# Patient Record
Sex: FEMALE | Race: OTHER | HISPANIC OR LATINO | Employment: FULL TIME | URBAN - METROPOLITAN AREA
[De-identification: names, ages, dates, MRNs, and addresses within clinical notes are randomized per-mention and may not be internally consistent; named-entity substitution may affect disease eponyms.]

---

## 2023-08-27 ENCOUNTER — HOSPITAL ENCOUNTER (EMERGENCY)
Facility: HOSPITAL | Age: 23
Discharge: HOME | End: 2023-08-27
Attending: EMERGENCY MEDICINE

## 2023-08-27 VITALS
HEART RATE: 129 BPM | TEMPERATURE: 101 F | OXYGEN SATURATION: 94 % | SYSTOLIC BLOOD PRESSURE: 113 MMHG | RESPIRATION RATE: 20 BRPM | DIASTOLIC BLOOD PRESSURE: 67 MMHG

## 2023-08-27 DIAGNOSIS — N12 PYELONEPHRITIS: Primary | ICD-10-CM

## 2023-08-27 LAB
BACTERIA UR QL AUTO: ABNORMAL /HPF
BILIRUB UR QL STRIP: ABNORMAL
CLARITY UR: CLEAR
COLOR UR: YELLOW
EXT PREGNANCY TEST URINE: NEGATIVE
EXT. CONTROL: NORMAL
GLUCOSE UR STRIP-MCNC: ABNORMAL MG/DL
HGB UR QL STRIP.AUTO: ABNORMAL
KETONES UR STRIP-MCNC: ABNORMAL MG/DL
LEUKOCYTE ESTERASE UR QL STRIP: NEGATIVE
MUCOUS THREADS UR QL AUTO: ABNORMAL
NITRITE UR QL STRIP: NEGATIVE
NON-SQ EPI CELLS URNS QL MICRO: ABNORMAL /HPF
PH UR STRIP.AUTO: 6 [PH] (ref 4.5–8)
PROT UR STRIP-MCNC: >=300 MG/DL
RBC #/AREA URNS AUTO: ABNORMAL /HPF
RENAL EPI CELLS #/AREA URNS HPF: PRESENT /[HPF]
SP GR UR STRIP.AUTO: >=1.03 (ref 1–1.03)
UROBILINOGEN UR QL STRIP.AUTO: 2 E.U./DL
WBC #/AREA URNS AUTO: ABNORMAL /HPF

## 2023-08-27 PROCEDURE — 87086 URINE CULTURE/COLONY COUNT: CPT

## 2023-08-27 PROCEDURE — 96372 THER/PROPH/DIAG INJ SC/IM: CPT

## 2023-08-27 PROCEDURE — 81025 URINE PREGNANCY TEST: CPT

## 2023-08-27 PROCEDURE — 99284 EMERGENCY DEPT VISIT MOD MDM: CPT | Performed by: EMERGENCY MEDICINE

## 2023-08-27 PROCEDURE — 81001 URINALYSIS AUTO W/SCOPE: CPT

## 2023-08-27 PROCEDURE — 99283 EMERGENCY DEPT VISIT LOW MDM: CPT

## 2023-08-27 RX ORDER — ONDANSETRON 4 MG/1
4 TABLET, ORALLY DISINTEGRATING ORAL ONCE
Status: COMPLETED | OUTPATIENT
Start: 2023-08-27 | End: 2023-08-27

## 2023-08-27 RX ORDER — ACETAMINOPHEN 325 MG/1
650 TABLET ORAL ONCE
Status: COMPLETED | OUTPATIENT
Start: 2023-08-27 | End: 2023-08-27

## 2023-08-27 RX ORDER — CEPHALEXIN 500 MG/1
500 CAPSULE ORAL EVERY 6 HOURS SCHEDULED
Qty: 40 CAPSULE | Refills: 0 | Status: SHIPPED | OUTPATIENT
Start: 2023-08-27 | End: 2023-09-06

## 2023-08-27 RX ORDER — IBUPROFEN 400 MG/1
400 TABLET ORAL ONCE
Status: COMPLETED | OUTPATIENT
Start: 2023-08-27 | End: 2023-08-27

## 2023-08-27 RX ADMIN — IBUPROFEN 400 MG: 400 TABLET, FILM COATED ORAL at 03:45

## 2023-08-27 RX ADMIN — ACETAMINOPHEN 650 MG: 325 TABLET, FILM COATED ORAL at 03:45

## 2023-08-27 RX ADMIN — CEFTRIAXONE 1000 MG: 1 INJECTION, POWDER, FOR SOLUTION INTRAMUSCULAR; INTRAVENOUS at 04:58

## 2023-08-27 RX ADMIN — ONDANSETRON 4 MG: 4 TABLET, ORALLY DISINTEGRATING ORAL at 03:45

## 2023-08-27 NOTE — DISCHARGE INSTRUCTIONS
Take antibiotics as prescribed. Follow-up with the primary care doctor above if symptoms do not improve in 2 to 3 days.

## 2023-08-27 NOTE — ED ATTENDING ATTESTATION
8/27/2023  ICody DO, saw and evaluated the patient. I have discussed the patient with the resident/non-physician practitioner and agree with the resident's/non-physician practitioner's findings, Plan of Care, and MDM as documented in the resident's/non-physician practitioner's note, except where noted. All available labs and Radiology studies were reviewed. I was present for key portions of any procedure(s) performed by the resident/non-physician practitioner and I was immediately available to provide assistance. At this point I agree with the current assessment done in the Emergency Department. I have conducted an independent evaluation of this patient a history and physical is as follows:    20 yo female c/o L flank pain, n/v/d for past 3 days, associated with fever, no vag dc or bleeding. Has some dysuria without hematuria. L flank TTP on exam  abd sntnd no r/g bs+    Imp: concern for pyelo plan: UA, upreg, CBC, BMP, tx sx, reassess.       ED Course         Critical Care Time  Procedures

## 2023-08-27 NOTE — ED PROVIDER NOTES
History  Chief Complaint   Patient presents with   • Vomiting     Patient reports n/v/d and fever since Friday, patient also c/o back pain     80-year-old female with no past medical history presents with nausea, vomiting, diarrhea, dysuria, preceded by left flank pain x5 days. Flank pain is described as moderate in intensity and constant. Denies hematuria, abdominal pain, pelvic pain, vaginal bleeding, vaginal discharge, or respiratory symptoms. No prior pregnancies. None       History reviewed. No pertinent past medical history. History reviewed. No pertinent surgical history. History reviewed. No pertinent family history. I have reviewed and agree with the history as documented. E-Cigarette/Vaping     E-Cigarette/Vaping Substances     Social History     Tobacco Use   • Smoking status: Never   • Smokeless tobacco: Never   Substance Use Topics   • Alcohol use: Yes     Comment: social   • Drug use: Not Currently        Review of Systems   Constitutional: Negative for chills and fever. HENT: Negative for ear pain and sore throat. Eyes: Negative for pain and visual disturbance. Respiratory: Negative for cough and shortness of breath. Cardiovascular: Negative for chest pain and palpitations. Gastrointestinal: Positive for diarrhea, nausea and vomiting. Negative for abdominal pain. Genitourinary: Positive for dysuria. Negative for hematuria. Musculoskeletal: Negative for arthralgias and back pain. Skin: Negative for color change and rash. Neurological: Negative for seizures and syncope. All other systems reviewed and are negative.       Physical Exam  ED Triage Vitals [08/27/23 0235]   Temperature Pulse Respirations Blood Pressure SpO2   (!) 101 °F (38.3 °C) (!) 129 20 113/67 94 %      Temp Source Heart Rate Source Patient Position - Orthostatic VS BP Location FiO2 (%)   Oral Monitor -- -- --      Pain Score       7             Orthostatic Vital Signs  Vitals:    08/27/23 0235 BP: 113/67   Pulse: (!) 129       Physical Exam  Vitals and nursing note reviewed. Constitutional:       General: She is not in acute distress. Appearance: Normal appearance. She is not ill-appearing, toxic-appearing or diaphoretic. HENT:      Head: Normocephalic and atraumatic. Right Ear: External ear normal.      Left Ear: External ear normal.      Nose: Nose normal.      Mouth/Throat:      Mouth: Mucous membranes are moist.      Pharynx: Oropharynx is clear. Eyes:      General:         Right eye: No discharge. Left eye: No discharge. Conjunctiva/sclera: Conjunctivae normal.   Cardiovascular:      Rate and Rhythm: Regular rhythm. Tachycardia present. Pulses: Normal pulses. Heart sounds: Normal heart sounds. No murmur heard. No friction rub. Pulmonary:      Effort: Pulmonary effort is normal. No respiratory distress. Breath sounds: Normal breath sounds. No wheezing or rales. Abdominal:      General: Abdomen is flat. Bowel sounds are normal. There is no distension. Palpations: Abdomen is soft. Tenderness: There is no abdominal tenderness. There is left CVA tenderness. There is no right CVA tenderness or guarding. Musculoskeletal:         General: Normal range of motion. Cervical back: Normal range of motion. Skin:     General: Skin is warm and dry. Capillary Refill: Capillary refill takes less than 2 seconds. Coloration: Skin is not pale. Neurological:      Mental Status: She is alert and oriented to person, place, and time.    Psychiatric:         Mood and Affect: Mood normal.         Behavior: Behavior normal.         ED Medications  Medications   cefTRIAXone (ROCEPHIN) 1,000 mg in lidocaine (PF) (XYLOCAINE-MPF) 1 % IM only syringe (has no administration in time range)   acetaminophen (TYLENOL) tablet 650 mg (650 mg Oral Given 8/27/23 0345)   ibuprofen (MOTRIN) tablet 400 mg (400 mg Oral Given 8/27/23 0345)   ondansetron (ZOFRAN-ODT) dispersible tablet 4 mg (4 mg Oral Given 8/27/23 0345)       Diagnostic Studies  Results Reviewed     Procedure Component Value Units Date/Time    Urine Microscopic [[id number]]  (Abnormal) Collected: 08/27/23 0355    Lab Status: Final result Specimen: Urine, Clean Catch Updated: 08/27/23 0407     RBC, UA 10-20 /hpf      WBC, UA Innumerable /hpf      Epithelial Cells Moderate /hpf      Bacteria, UA Occasional /hpf      MUCUS THREADS Innumerable     Renal Epithelial Cells Present    Urine culture [[id number]] Collected: 08/27/23 0355    Lab Status: In process Specimen: Urine, Clean Catch Updated: 08/27/23 0407    Urine Macroscopic, POC [[id number]]  (Abnormal) Collected: 08/27/23 0355    Lab Status: Final result Specimen: Urine Updated: 08/27/23 0356     Color, UA Yellow     Clarity, UA Clear     pH, UA 6.0     Leukocytes, UA Negative     Nitrite, UA Negative     Protein, UA >=300 mg/dl      Glucose,  (1/10%) mg/dl      Ketones, UA 15 (1+) mg/dl      Urobilinogen, UA 2.0 E.U./dl      Bilirubin, UA Moderate     Occult Blood, UA Small     Specific Gravity, UA >=1.030    Narrative:      CLINITEK RESULT    POCT pregnancy, urine [[id number]]  (Normal) Resulted: 08/27/23 0320    Lab Status: Final result Updated: 08/27/23 0320     EXT Preg Test, Ur Negative     Control Valid                 No orders to display         Procedures  Procedures      ED Course                             SBIRT 20yo+    Flowsheet Row Most Recent Value   Initial Alcohol Screen: US AUDIT-C     1. How often do you have a drink containing alcohol? 0 Filed at: 08/27/2023 0406   2. How many drinks containing alcohol do you have on a typical day you are drinking? 0 Filed at: 08/27/2023 0406   3b. FEMALE Any Age, or MALE 65+: How often do you have 4 or more drinks on one occassion? 0 Filed at: 08/27/2023 0406   Audit-C Score 0 Filed at: 08/27/2023 2970   LETICIA: How many times in the past year have you. ..     Used an illegal drug or used a prescription medication for non-medical reasons? Never Filed at: 08/27/2023 0406                Medical Decision Making  51-year-old female with presentation suggestive of upper urinary tract infection. No findings on history or physical exam to suggest urosepsis, ureterolithiasis, or any acute surgical abdominal pathology. Plan: Urine pregnancy, urinalysis, analgesia    Pyelonephritis: acute illness or injury  Amount and/or Complexity of Data Reviewed  Labs: ordered. Risk  OTC drugs. Prescription drug management. Disposition  Final diagnoses:   Pyelonephritis     Time reflects when diagnosis was documented in both MDM as applicable and the Disposition within this note     Time User Action Codes Description Comment    8/27/2023  4:37 AM Regla Garcia Add [N12] Pyelonephritis       ED Disposition     ED Disposition   Discharge    Condition   Stable    Date/Time   Sun Aug 27, 2023  4:37 AM    Comment   [address] [place] [address] discharge to home/self care. Follow-up Information     Follow up With Specialties Details Why Contact Info Additional [facility] [place] [address] Family Medicine Schedule an appointment as soon as possible for a visit  If symptoms worsen [facility] [id number]  [address] [place] [address], [address] [place] [address]          Patient's Medications   Discharge Prescriptions    CEPHALEXIN (KEFLEX) 500 MG CAPSULE    Take 1 capsule (500 mg total) by mouth every 6 (six) hours for 10 days       Start Date: 8/27/2023 End Date: 9/6/2023       Order Dose: 500 mg       Quantity: 40 capsule    Refills: 0     No discharge procedures on file. PDMP Review     None           ED Provider  Attending physically available and evaluated [address] [place] [address]. I managed the patient along with the ED Attending.     Electronically Signed by         Jorge Oviedo MD  08/27/23 2264

## 2023-08-28 LAB — BACTERIA UR CULT: NORMAL

## 2024-01-02 ENCOUNTER — ULTRASOUND (OUTPATIENT)
Dept: OBGYN CLINIC | Facility: CLINIC | Age: 24
End: 2024-01-02

## 2024-01-02 VITALS — WEIGHT: 111.2 LBS | SYSTOLIC BLOOD PRESSURE: 129 MMHG | HEART RATE: 80 BPM | DIASTOLIC BLOOD PRESSURE: 65 MMHG

## 2024-01-02 DIAGNOSIS — N92.6 MISSED PERIOD: Primary | ICD-10-CM

## 2024-01-02 DIAGNOSIS — Z3A.09 9 WEEKS GESTATION OF PREGNANCY: ICD-10-CM

## 2024-01-02 LAB — SL AMB POCT URINE HCG: POSITIVE

## 2024-01-02 NOTE — ASSESSMENT & PLAN NOTE
- Carrizales, viable, intrauterine pregnancy at 9w3d by LMP  - Pregnancy warning signs reviewed  - Return to office for OB nurse intake visit and prenatal H&P

## 2024-01-02 NOTE — PROGRESS NOTES
Critical access hospital  Obstetrics & Gynecology  2024    S: 23 y.o.  who presents for viability scan. Her LMP was 10/28/23 and she is 9 weeks and 3 days by her LMP. She reports no complaints today. She denies cramping or vaginal bleeding. This pregnancy was planned. Previous pregnancies 1 spontaneous  that did not require medications or surgeries. Medical problems include: none. Current medications: prenatal vitamins. Smoking/drinking/illicit drug use: denies.     O:  Vitals:    24 0859   BP: 129/65   Pulse: 80   Weight: 50.4 kg (111 lb 3.2 oz)     There is no height or weight on file to calculate BMI.    Imaging:  First Trimester Ultrasound  Indication: Amenorrhea, viability/dating  Comparison: None.  Technique: Transvaginal imaging was performed to assess the gestation, myometrial/endometrial architecture and ovarian parenchymal detail  Findings: A single intrauterine gestation is identified with cardiac activity is detected at 166bpm. Yolk sac is present and normal in size and appearance. Mean crown rump length is 23.4 mm = 9 weeks 0 days. Amniotic fluid appears normal. No adnexal masses or pathologic cysts identified. No free fluid.  Impression: Ultrasound dating consistent with LMP. Final SUDHIR of 8/3/2024 (by LMP).     A/P:  Problem List Items Addressed This Visit          Other    9 weeks gestation of pregnancy     - Carrizales, viable, intrauterine pregnancy at 9w3d by LMP  - Pregnancy warning signs reviewed  - Return to office for OB nurse intake visit and prenatal H&P           Relevant Orders    HIV 1/2 AG/AB w Reflex [facility] for 2 yr old and above    Hepatitis C antibody    UA (URINE) with reflex to Scope    Urine culture    Hepatitis B surface antigen    CBC and differential    Rubella antibody, IgG    Type and screen    RPR-Syphilis Screening (Total Syphilis IGG/IGM)    Hepatitis B surface antibody    Anemia Panel w/Reflex, OB    Inheritest CF/SMA Panel    Hgb Fractionation Cascade     SMA Carrier Screen    Ambulatory Referral to Maternal Fetal Medicine     Other Visit Diagnoses       Missed period    -  Primary    Relevant Orders    POCT urine HCG    AMB US OB < 14 weeks single or first gestation level 1 (Completed)            Patient discussed with Dr. Hitchcock.    Ashley Joshi MD  PGY-IV, OB/GYN  1/2/2024, 9:27 AM

## 2024-01-10 ENCOUNTER — LAB (OUTPATIENT)
Dept: LAB | Facility: CLINIC | Age: 24
End: 2024-01-10
Payer: COMMERCIAL

## 2024-01-10 DIAGNOSIS — Z3A.09 9 WEEKS GESTATION OF PREGNANCY: ICD-10-CM

## 2024-01-10 LAB
ABO GROUP BLD: NORMAL
BACTERIA UR QL AUTO: ABNORMAL /HPF
BASOPHILS # BLD AUTO: 0.05 THOUSANDS/ÂΜL (ref 0–0.1)
BASOPHILS NFR BLD AUTO: 1 % (ref 0–1)
BILIRUB UR QL STRIP: NEGATIVE
BLD GP AB SCN SERPL QL: NEGATIVE
CLARITY UR: CLEAR
COLOR UR: YELLOW
EOSINOPHIL # BLD AUTO: 0.16 THOUSAND/ÂΜL (ref 0–0.61)
EOSINOPHIL NFR BLD AUTO: 2 % (ref 0–6)
ERYTHROCYTE [DISTWIDTH] IN BLOOD BY AUTOMATED COUNT: 12.8 % (ref 11.6–15.1)
GLUCOSE UR STRIP-MCNC: NEGATIVE MG/DL
HBV SURFACE AB SER-ACNC: <3 MIU/ML
HBV SURFACE AG SER QL: NORMAL
HCT VFR BLD AUTO: 39 % (ref 34.8–46.1)
HCV AB SER QL: NORMAL
HGB BLD-MCNC: 12.8 G/DL (ref 11.5–15.4)
HGB UR QL STRIP.AUTO: NEGATIVE
HIV 1+2 AB+HIV1 P24 AG SERPL QL IA: NORMAL
HIV 2 AB SERPL QL IA: NORMAL
HIV1 AB SERPL QL IA: NORMAL
HIV1 P24 AG SERPL QL IA: NORMAL
IMM GRANULOCYTES # BLD AUTO: 0.02 THOUSAND/UL (ref 0–0.2)
IMM GRANULOCYTES NFR BLD AUTO: 0 % (ref 0–2)
KETONES UR STRIP-MCNC: NEGATIVE MG/DL
LEUKOCYTE ESTERASE UR QL STRIP: NEGATIVE
LYMPHOCYTES # BLD AUTO: 1.27 THOUSANDS/ÂΜL (ref 0.6–4.47)
LYMPHOCYTES NFR BLD AUTO: 17 % (ref 14–44)
MCH RBC QN AUTO: 29.6 PG (ref 26.8–34.3)
MCHC RBC AUTO-ENTMCNC: 32.8 G/DL (ref 31.4–37.4)
MCV RBC AUTO: 90 FL (ref 82–98)
MONOCYTES # BLD AUTO: 0.59 THOUSAND/ÂΜL (ref 0.17–1.22)
MONOCYTES NFR BLD AUTO: 8 % (ref 4–12)
MUCOUS THREADS UR QL AUTO: ABNORMAL
NEUTROPHILS # BLD AUTO: 5.51 THOUSANDS/ÂΜL (ref 1.85–7.62)
NEUTS SEG NFR BLD AUTO: 72 % (ref 43–75)
NITRITE UR QL STRIP: NEGATIVE
NON-SQ EPI CELLS URNS QL MICRO: ABNORMAL /HPF
NRBC BLD AUTO-RTO: 0 /100 WBCS
PH UR STRIP.AUTO: 7 [PH]
PLATELET # BLD AUTO: 312 THOUSANDS/UL (ref 149–390)
PMV BLD AUTO: 10.2 FL (ref 8.9–12.7)
PROT UR STRIP-MCNC: ABNORMAL MG/DL
RBC # BLD AUTO: 4.32 MILLION/UL (ref 3.81–5.12)
RBC #/AREA URNS AUTO: ABNORMAL /HPF
RH BLD: POSITIVE
RUBV IGG SERPL IA-ACNC: 17.5 IU/ML
SP GR UR STRIP.AUTO: 1.03 (ref 1–1.03)
SPECIMEN EXPIRATION DATE: NORMAL
TREPONEMA PALLIDUM IGG+IGM AB [PRESENCE] IN SERUM OR PLASMA BY IMMUNOASSAY: NORMAL
UROBILINOGEN UR STRIP-ACNC: <2 MG/DL
WBC # BLD AUTO: 7.6 THOUSAND/UL (ref 4.31–10.16)
WBC #/AREA URNS AUTO: ABNORMAL /HPF

## 2024-01-10 PROCEDURE — 87077 CULTURE AEROBIC IDENTIFY: CPT

## 2024-01-10 PROCEDURE — 85025 COMPLETE CBC W/AUTO DIFF WBC: CPT

## 2024-01-10 PROCEDURE — 81001 URINALYSIS AUTO W/SCOPE: CPT | Performed by: OBSTETRICS & GYNECOLOGY

## 2024-01-10 PROCEDURE — 86706 HEP B SURFACE ANTIBODY: CPT

## 2024-01-10 PROCEDURE — 86803 HEPATITIS C AB TEST: CPT

## 2024-01-10 PROCEDURE — 86850 RBC ANTIBODY SCREEN: CPT

## 2024-01-10 PROCEDURE — 87340 HEPATITIS B SURFACE AG IA: CPT

## 2024-01-10 PROCEDURE — 86900 BLOOD TYPING SEROLOGIC ABO: CPT

## 2024-01-10 PROCEDURE — 87086 URINE CULTURE/COLONY COUNT: CPT

## 2024-01-10 PROCEDURE — 86901 BLOOD TYPING SEROLOGIC RH(D): CPT

## 2024-01-10 PROCEDURE — 36415 COLL VENOUS BLD VENIPUNCTURE: CPT

## 2024-01-10 PROCEDURE — 87389 HIV-1 AG W/HIV-1&-2 AB AG IA: CPT

## 2024-01-10 PROCEDURE — 86762 RUBELLA ANTIBODY: CPT

## 2024-01-10 PROCEDURE — 86780 TREPONEMA PALLIDUM: CPT

## 2024-01-10 PROCEDURE — 83020 HEMOGLOBIN ELECTROPHORESIS: CPT

## 2024-01-12 LAB
BACTERIA UR CULT: ABNORMAL
BACTERIA UR CULT: ABNORMAL
HGB A MFR BLD: 2.4 % (ref 1.8–3.2)
HGB A MFR BLD: 97.6 % (ref 96.4–98.8)
HGB F MFR BLD: 0 % (ref 0–2)
HGB FRACT BLD-IMP: NORMAL
HGB S MFR BLD: 0 %

## 2024-01-15 DIAGNOSIS — O23.41 URINARY TRACT INFECTION IN MOTHER DURING FIRST TRIMESTER OF PREGNANCY: Primary | ICD-10-CM

## 2024-01-15 RX ORDER — METRONIDAZOLE 500 MG/1
500 TABLET ORAL EVERY 8 HOURS SCHEDULED
Qty: 21 TABLET | Refills: 0 | Status: SHIPPED | OUTPATIENT
Start: 2024-01-15 | End: 2024-01-22

## 2024-01-15 NOTE — RESULT ENCOUNTER NOTE
Leonard Zambrano,    You will be seeing this patient tomorrow for her nurse intake. Can you please let her know that we will be treating her Gardnerella UTI with Flagyl for 7 days? Otherwise her prenatal labs are normal.    Thank you,  Ashley Joshi

## 2024-01-16 ENCOUNTER — INITIAL PRENATAL (OUTPATIENT)
Dept: OBGYN CLINIC | Facility: CLINIC | Age: 24
End: 2024-01-16

## 2024-01-16 VITALS
SYSTOLIC BLOOD PRESSURE: 110 MMHG | DIASTOLIC BLOOD PRESSURE: 66 MMHG | BODY MASS INDEX: 19.97 KG/M2 | WEIGHT: 117 LBS | HEIGHT: 64 IN | HEART RATE: 81 BPM

## 2024-01-16 DIAGNOSIS — Z3A.11 11 WEEKS GESTATION OF PREGNANCY: ICD-10-CM

## 2024-01-16 DIAGNOSIS — Z34.91 PRENATAL CARE IN FIRST TRIMESTER: Primary | ICD-10-CM

## 2024-01-16 DIAGNOSIS — Z31.430 ENCOUNTER OF FEMALE FOR TESTING FOR GENETIC DISEASE CARRIER STATUS FOR PROCREATIVE MANAGEMENT: ICD-10-CM

## 2024-01-16 PROCEDURE — OBC

## 2024-01-16 NOTE — LETTER
1/16/2024      This letter is to confirm that Lakesha Chong is pregnant and is under our care.  Her Estimated Date of Delivery: 8/3/24.    If you have any questions or concerns, please contact our office.  Thank you,    PHILIP Villa

## 2024-01-16 NOTE — LETTER
"    [facility] REFERRAL      Date: 1/16/2024    Patient name: Lakesha Chong    YOB: 2000    Estimated Date of Delivery: 8/3/24      /66 (BP Location: Left arm, Patient Position: Sitting, Cuff Size: Standard)   Pulse 81   Ht 5' 4\" (1.626 m)   Wt 53.1 kg (117 lb)   LMP 10/28/2023   BMI 20.08 kg/m²         Thank you,  PHILIP Villa            [facility] locations:   1. [facility]       [address] [place] [address]       Phone: [phone]       Fax: [phone]     2. [facility] [place]       [address] [address]       Phone: [phone]       Fax: [phone]       "

## 2024-01-16 NOTE — PATIENT INSTRUCTIONS
SENALES CARITO EL EMBARAZO  Llame a nuestra oficina al [phone] para cualquiera de los siguientes:    1. sangrado vaginal  2. Dolor abdominal valentina que no desaparece.  3. Fiebre (más de 100.4 y no se marcos con Tylenol)  4. Vómitos persistentes que dominguez más de 24 horas.  5. dolor de pecho  6. Dolor o ardor al orinar.  7. Dolor de john severo que no se resuelve con Tylenol  8. Visión borrosa o zack puntos en jay visión.  9. Hinchazón repentina de jay dalton o raul.  10. Enrojecimiento, hinchazón o dolor en brandon pierna.  11. Un aumento de peso repentino en pocos días.  12. Contar los movimientos fetales del bebé. (después de 28 semanas o el sexto mes de embarazo)  13. Brandon pérdida de líquido acuoso de la vagina: puede ser un chorro, un goteo o brandon humedad continua  14. Después de 20 semanas de embarazo, calambres rítmicos (más de 4 por hora) o menstruales jenise dolor bajo / pélvico

## 2024-01-16 NOTE — PROGRESS NOTES
"OBSTETRIC INTAKE VISIT    Lakesha Chong presents today for initial OB visit and intake at 11w3d. LMP - 10/28/23.  Pt informed has a UTI and doctor prescribed Flagyl which is going to be sent to  Pharmacy. Pt verbalized understanding. History obtained from patient and she reports it as follows:    History reviewed. No pertinent past medical history.  History reviewed. No pertinent surgical history.  OB History    Para Term  AB Living   2 0 0 0 1 0   SAB IAB Ectopic Multiple Live Births   1 0 0 0 0      # Outcome Date GA Lbr Luis Angel/2nd Weight Sex Delivery Anes PTL Lv   2 Current            1 SAB 2020 8w0d    SAB        Social History     Tobacco Use    Smoking status: Never    Smokeless tobacco: Never   Vaping Use    Vaping status: Former   Substance Use Topics    Alcohol use: Not Currently     Comment: social    Drug use: Not Currently       Current Outpatient Medications   Medication Instructions    metroNIDAZOLE (FLAGYL) 500 mg, Oral, Every 8 hours scheduled       No Known Allergies    Vitals: /66 (BP Location: Left arm, Patient Position: Sitting, Cuff Size: Standard)   Pulse 81   Ht 5' 4\" (1.626 m)   Wt 53.1 kg (117 lb)   LMP 10/28/2023   BMI 20.08 kg/m²     Review of Systems:  Denies vaginal bleeding or leaking fluid.  Denies abdominal/pelvic pain or contractions.    Plan:  1. OB labwork ordered previously.  Advised patient to have drawn within the next few days.  2. Next ultrasound is scheduled for 24.  3. Return 24 for H&P prenatal visit.  4. Referrals placed for WIC, , and [facility].  5. Given vaccines: Flu vaccine declined this visit  6. Patient's depression screening was assessed with a PHQ-2 score of 1. Their PHQ-9 score was 3. Clinically patient does not have depression. No treatment is required.   in to see patient.  7. Reviewed the following educational topics with patient:   -routine prenatal " visit/ultrasound/labwork schedule   -hospital for delivery and office phone/answering service contact information   -nutritional demands of pregnancy, healthy dietary habits   -listeria, toxoplasmosis, seafood precautions   -weight gain expectations (based on pre-pregnant BMI)   -exercise, rest, and sexual activity during pregnancy   -abstinence from alcohol, tobacco, and illegal drugs   -common discomforts of pregnancy and appropriate management   -OTC medications safe to use in pregnancy   -genetic screening options   -vaccines in pregnancy   -symptoms to report to OB provider    -signs of PTL and pre-eclampsia    -vaginal bleeding/leaking of fluid    -severe n/v-unable to tolerate ANY food/fluids for more than 24 hours

## 2024-01-17 ENCOUNTER — PATIENT OUTREACH (OUTPATIENT)
Dept: OBGYN CLINIC | Facility: CLINIC | Age: 24
End: 2024-01-17

## 2024-01-17 NOTE — PROGRESS NOTES
CHRIS ROJAS attempted to outreach the pt with the [employer] interpretor, attempted x2, there was no answer and no ability to leave a VM.

## 2024-01-22 ENCOUNTER — PATIENT OUTREACH (OUTPATIENT)
Dept: OBGYN CLINIC | Facility: CLINIC | Age: 24
End: 2024-01-22

## 2024-01-26 ENCOUNTER — APPOINTMENT (OUTPATIENT)
Dept: LAB | Facility: HOSPITAL | Age: 24
End: 2024-01-26
Attending: NURSE PRACTITIONER
Payer: COMMERCIAL

## 2024-01-26 DIAGNOSIS — Z34.91 PRENATAL CARE IN FIRST TRIMESTER: ICD-10-CM

## 2024-01-26 DIAGNOSIS — Z3A.11 11 WEEKS GESTATION OF PREGNANCY: ICD-10-CM

## 2024-01-26 DIAGNOSIS — Z31.430 ENCOUNTER OF FEMALE FOR TESTING FOR GENETIC DISEASE CARRIER STATUS FOR PROCREATIVE MANAGEMENT: ICD-10-CM

## 2024-01-26 PROCEDURE — 36415 COLL VENOUS BLD VENIPUNCTURE: CPT

## 2024-01-30 NOTE — PROGRESS NOTES
CHRIS ROJAS outreached the patient to complete a PN SW assessment, pt reports that she is at work right now so can not complete the assessment but is interested in applying for medicaid as a secondary insurance. CHRIS ROJAS sent her the information for the financial counselors office for her to call and discuss further as they can advise her if she would qualify based on income.     CHRIS ROJAS emailed FC number, however, email bounced back as undeliverable.    Pt states I can call back Friday anytime to f/u with completing the assessment.

## 2024-02-01 ENCOUNTER — DOCUMENTATION (OUTPATIENT)
Dept: PERINATAL CARE | Facility: OTHER | Age: 24
End: 2024-02-01

## 2024-02-01 ENCOUNTER — ROUTINE PRENATAL (OUTPATIENT)
Age: 24
End: 2024-02-01
Payer: COMMERCIAL

## 2024-02-01 VITALS
DIASTOLIC BLOOD PRESSURE: 54 MMHG | HEIGHT: 64 IN | SYSTOLIC BLOOD PRESSURE: 94 MMHG | HEART RATE: 73 BPM | WEIGHT: 117 LBS | BODY MASS INDEX: 19.97 KG/M2

## 2024-02-01 DIAGNOSIS — Z36.82 ENCOUNTER FOR (NT) NUCHAL TRANSLUCENCY SCAN: Primary | ICD-10-CM

## 2024-02-01 DIAGNOSIS — Z3A.13 13 WEEKS GESTATION OF PREGNANCY: ICD-10-CM

## 2024-02-01 PROCEDURE — 99202 OFFICE O/P NEW SF 15 MIN: CPT | Performed by: STUDENT IN AN ORGANIZED HEALTH CARE EDUCATION/TRAINING PROGRAM

## 2024-02-01 PROCEDURE — 76813 OB US NUCHAL MEAS 1 GEST: CPT | Performed by: STUDENT IN AN ORGANIZED HEALTH CARE EDUCATION/TRAINING PROGRAM

## 2024-02-01 NOTE — PROGRESS NOTES
"[facility]: Ms. Chong was seen today for nuchal translucency ultrasound.  See ultrasound report under \"OB Procedures\" tab.        Physical Exam  Constitutional:       General: She is not in acute distress.     Appearance: Normal appearance.   HENT:      Head: Normocephalic and atraumatic.   Eyes:      Extraocular Movements: Extraocular movements intact.   Cardiovascular:      Rate and Rhythm: Normal rate.   Pulmonary:      Effort: Pulmonary effort is normal. No respiratory distress.   Skin:     Findings: No erythema or rash.   Neurological:      Mental Status: She is alert and oriented to person, place, and time.   Psychiatric:         Mood and Affect: Mood normal.         Behavior: Behavior normal.         Please don't hesitate to contact our office with any concerns or questions.  -Fabby Resendiz MD    "

## 2024-02-01 NOTE — LETTER
"2024     Ashley Joshi MD  [address] [facility]  [address] [place], [place] [address]    Patient: Lakesha Chong   YOB: 2000   Date of Visit: 2024       Dear Dr. Joshi:    Thank you for referring Lakesha Chong to me for evaluation. Below are my notes for this consultation.    If you have questions, please do not hesitate to call me. I look forward to following your patient along with you.         Sincerely,        Fabby Resendiz MD        CC: No Recipients    Fabby Resendiz MD  2024  8:41 AM  Sign when Signing Visit  [facility]: Ms. Chong was seen today for nuchal translucency ultrasound.  See ultrasound report under \"OB Procedures\" tab.        Physical Exam  Constitutional:       General: She is not in acute distress.     Appearance: Normal appearance.   HENT:      Head: Normocephalic and atraumatic.   Eyes:      Extraocular Movements: Extraocular movements intact.   Cardiovascular:      Rate and Rhythm: Normal rate.   Pulmonary:      Effort: Pulmonary effort is normal. No respiratory distress.   Skin:     Findings: No erythema or rash.   Neurological:      Mental Status: She is alert and oriented to person, place, and time.   Psychiatric:         Mood and Affect: Mood normal.         Behavior: Behavior normal.         Please don't hesitate to contact our office with any concerns or questions.  -Fabby Resendiz MD    "

## 2024-02-01 NOTE — PROGRESS NOTES
Insurance Authorization for your MAT21 [facility] TEST CODE [id number] genetic screening has been approved. Auth # is [id number] with dates of service 02/01/2024 -05/01/2024.  Per Burt at [facility] call ref#[id number]  Insurance Authorization is not a guarantee of payment and final determination is based on your member benefits, appropriateness of service provided and eligibility at time of services rendered and claim received. Please follow up with [employer] for your OOP copay.  Patient has a nurse visit 2/7/24 at [facility] @1145 am

## 2024-02-02 ENCOUNTER — PATIENT OUTREACH (OUTPATIENT)
Dept: OBGYN CLINIC | Facility: CLINIC | Age: 24
End: 2024-02-02

## 2024-02-02 NOTE — PROGRESS NOTES
CHRIS ROJAS was referred by PHILIP Bowen to complete a PN SW assessment in the first trimester. Pt is currently , she is [id number], her SUDHIR is 2024. She is Icelandic speaking and CHRIS ROJAS utilized the [employer]or to complete the assessment via phone today.     The patient reports this pregnancy is planned and welcomed. She lives with her partner in an apartment . They have no housing concerns. Pt is employed selling cleaning products. FOB is employed doing construction. Pt has [facility] commercial insurance, she is interested in seeing if she would qualify for medicaid secondary, CHRIS ROJAS sent her the FC number to discuss further. Pt plans to apply for WIC. She has no concerns about obtaining baby supplies. FOB drives her to appointments.     Pt denies any CYS, legal, IPV, MH or D&A history or concerns.     CHRIS ROJAS emailed the financial counselor information to   [email or web address]    CHRIS ROJAS encouraged the pt to outreach as needed but otherwise will close this referral as no social work needs have been identified.

## 2024-02-05 LAB
CITATION REF LAB TEST: NORMAL
CLINICAL INFO: NORMAL
ETHNIC BACKGROUND STATED: NORMAL
GENE DIS ANL CARRIER INTERP-IMP: NORMAL
GENE MUT TESTED BLD/T: NORMAL
LAB DIRECTOR NAME PROVIDER: NORMAL
REASON FOR REFERRAL (NARRATIVE): NORMAL
RECOMMENDATION PATIENT DOC-IMP: NORMAL
REF LAB TEST METHOD: NORMAL
SERVICE CMNT-IMP: NORMAL
SMN1 GENE MUT ANL BLD/T: NORMAL
SPECIMEN SOURCE: NORMAL

## 2024-02-07 ENCOUNTER — OFFICE VISIT (OUTPATIENT)
Age: 24
End: 2024-02-07
Payer: COMMERCIAL

## 2024-02-07 DIAGNOSIS — Z36.0 ENCOUNTER FOR ANTENATAL SCREENING FOR CHROMOSOMAL ANOMALIES: Primary | ICD-10-CM

## 2024-02-07 PROCEDURE — 36415 COLL VENOUS BLD VENIPUNCTURE: CPT | Performed by: OBSTETRICS & GYNECOLOGY

## 2024-02-07 NOTE — PROGRESS NOTES
Patient chose to have [facility] [id number] Non-Invasive Prenatal Screen with fetal sex.   Patient choose billed through insurance.     Patient given brochure and is aware [facility] will contact patient's insurance and coordinate coverage.  Provided [facility] contact information. General inquiries [phone], Cost estimates [phone] and [facility] Billing [phone]. Website [email or web address].     Blood collection tubes labeled with patient identifiers (name, medical record number, and date of birth).     Filled out [facility] order form. Patient chose to have blood drawn in our Maternal Fetal Medicine office. Blood work drawn by Rowan Duncan   If patient had blood work in office: Blood drawn right arm and with a straight needle.   If patient chose to have blood work drawn at a [facility] lab we requested patient notify MFM (via phone call or [email or web address] message) when blood collected so office can follow up on results.     Copy of lab order scanned to Epic media.     Maternal Fetal Medicine will have results in approximately 5-7 business days and will call patient or notify via [email or web address].  Patient aware viewing lab result online will reveal fetal sex if ordered.    Patient verbalized understanding of all instructions and no questions at this time.

## 2024-02-09 ENCOUNTER — INITIAL PRENATAL (OUTPATIENT)
Dept: OBGYN CLINIC | Facility: CLINIC | Age: 24
End: 2024-02-09

## 2024-02-09 VITALS
SYSTOLIC BLOOD PRESSURE: 106 MMHG | WEIGHT: 117.8 LBS | DIASTOLIC BLOOD PRESSURE: 69 MMHG | HEIGHT: 64 IN | HEART RATE: 78 BPM | BODY MASS INDEX: 20.11 KG/M2

## 2024-02-09 DIAGNOSIS — Z3A.14 14 WEEKS GESTATION OF PREGNANCY: ICD-10-CM

## 2024-02-09 DIAGNOSIS — Z34.92 PRENATAL CARE IN SECOND TRIMESTER: Primary | ICD-10-CM

## 2024-02-09 PROBLEM — Z78.9 NOT IMMUNE TO HEPATITIS B VIRUS: Status: ACTIVE | Noted: 2024-02-09

## 2024-02-09 PROCEDURE — PNV: Performed by: NURSE PRACTITIONER

## 2024-02-09 PROCEDURE — 87624 HPV HI-RISK TYP POOLED RSLT: CPT | Performed by: NURSE PRACTITIONER

## 2024-02-09 PROCEDURE — G0145 SCR C/V CYTO,THINLAYER,RESCR: HCPCS | Performed by: PATHOLOGY

## 2024-02-09 PROCEDURE — G0124 SCREEN C/V THIN LAYER BY MD: HCPCS | Performed by: PATHOLOGY

## 2024-02-09 NOTE — PATIENT INSTRUCTIONS
[place]averick por jay confianza en nuestro equipo.   Le agradecemos y agradecemos bel comentarios.   Si recibe candice encuesta nuestra, tómese unos momentos para informarnos cómo estamos.   Sinceramente,  PHILIP Bhatti     SENALES CARITO EL EMBARAZO  Llame a nuestra oficina al [phone] para cualquiera de los siguientes:    1. sangrado vaginal  2. Dolor abdominal valentina que no desaparece.  3. Fiebre (más de 100.4 y no se marcos con Tylenol)  4. Vómitos persistentes que dominguez más de 24 horas.  5. dolor de pecho  6. Dolor o ardor al orinar.  7. Dolor de john severo que no se resuelve con Tylenol  8. Visión borrosa o zack puntos en jay visión.  9. Hinchazón repentina de jay dalton o raul.  10. Enrojecimiento, hinchazón o dolor en candice pierna.  11. Un aumento de peso repentino en pocos días.  12. Contar los movimientos fetales del bebé. (después de 28 semanas o el sexto mes de embarazo)  13. Candice pérdida de líquido acuoso de la vagina: puede ser un chorro, un goteo o candice humedad continua  14. Después de 20 semanas de embarazo, calambres rítmicos (más de 4 por hora) o menstruales jenise dolor bajo / pélvico

## 2024-02-09 NOTE — PROGRESS NOTES
Lakesha Chong presents today for OB H&P visit at 14w6d.  Blood Pressure: 106/69  Wt=53.4 kg (117 lb 12.8 oz); Body mass index is 20.22 kg/m².; TWG=3.538 kg (7 lb 12.8 oz)  Fetal Heart Rate: 145  Abdomen: gravid, soft, non-tender.  She reports no complaints.  Denies uterine contractions or persistent cramping.  Denies vaginal bleeding or leaking of fluid.  Pap/GC/CT collected.  Scheduled for ultrasound 3/26/24.  Reviewed common discomforts of pregnancy in second trimester and warning signs.  Advised to continue medications and return in 4 weeks.      Current Outpatient Medications   Medication Instructions    Prenatal Vit-Fe Fumarate-FA (PRENATAL VITAMIN PO) 1 tablet, Oral, Daily       Laboratory workup: initial OB labs (done 1/10/24)    Genetic Screening: desires NIPS    Vaccinations: influenza (declined 24); Tdap (will offer after 27 weeks); RSV (not indicated outside RSV season); COVID-19 (rec'd x3 doses per pt - will bring documentation)    Postpartum contraception: desires Nexplanon    Fetal Ultrasounds:  24 (9w0d) EDC confirmed  24 (13w5d) NT WNL      G1 Problems (from 24 to present)       Problem Noted Resolved    Not immune to HBV 2024 by PHILIP Bhatti No    Overview Signed 2024  2:44 PM by PHILIP Bhatti     Referred to PCP               Past Medical History:   Diagnosis Date    No known health problems      Past Surgical History:   Procedure Laterality Date    NO PAST SURGERIES       OB History          2    Para   0    Term   0       0    AB   1    Living   0         SAB   1    IAB   0    Ectopic   0    Multiple   0    Live Births   0               Social History     Tobacco Use    Smoking status: Never    Smokeless tobacco: Never   Vaping Use    Vaping status: Former    Quit date: 12/15/2023    Substances: Nicotine, Flavoring   Substance Use Topics    Alcohol use: Not Currently     Comment: couple times/month, none since  pregnant    Drug use: Never

## 2024-02-11 LAB
CFDNA.FET/CFDNA.TOTAL SFR FETUS: NORMAL %
CFDNA.FET/CFDNA.TOTAL SFR FETUS: NORMAL %
CITATION REF LAB TEST: NORMAL
FET 13+18+21+X+Y ANEUP PLAS.CFDNA: NEGATIVE
FET CHR 21 TS PLAS.CFDNA QL: NEGATIVE
FET CHR 21 TS PLAS.CFDNA QL: NEGATIVE
FET MS X RISK WBC.DNA+CFDNA QL: NOT DETECTED
FET SEX PLAS.CFDNA DOSAGE CFDNA: NORMAL
FET TS 13 RISK PLAS.CFDNA QL: NEGATIVE
FET X + Y ANEUP RISK PLAS.CFDNA SEQ-IMP: NOT DETECTED
GESTATION: NORMAL
GESTATIONAL AGE > 9:: YES
LAB DIRECTOR NAME PROVIDER: NORMAL
LABORATORY COMMENT REPORT: NORMAL
LIMITATIONS OF THE TEST: NORMAL
NEGATIVE PREDICTIVE VALUE: NORMAL
PERFORMANCE CHARACTERISTICS: NORMAL
POSITIVE PREDICTIVE VALUE: NORMAL
REF LAB TEST METHOD: NORMAL
SL AMB NOTE:: NORMAL
TEST PERFORMANCE INFO SPEC: NORMAL

## 2024-02-15 PROBLEM — R87.619 ABNORMAL PAP SMEAR OF CERVIX: Status: ACTIVE | Noted: 2024-02-15

## 2024-02-15 LAB
HPV HR 12 DNA CVX QL NAA+PROBE: POSITIVE
HPV16 DNA CVX QL NAA+PROBE: NEGATIVE
HPV18 DNA CVX QL NAA+PROBE: NEGATIVE
LAB AP GYN PRIMARY INTERPRETATION: ABNORMAL
LAB AP LMP: ABNORMAL
Lab: ABNORMAL
PATH INTERP SPEC-IMP: ABNORMAL

## 2024-02-16 ENCOUNTER — TELEPHONE (OUTPATIENT)
Dept: OBGYN CLINIC | Facility: CLINIC | Age: 24
End: 2024-02-16

## 2024-02-16 LAB
CFTR FULL MUT ANL BLD/T SEQ: NORMAL
CITATION REF LAB TEST: NORMAL
CLINICAL INFO: NORMAL
ETHNIC BACKGROUND STATED: NORMAL
GENE DIS ANL CARRIER INTERP-IMP: NORMAL
INDICATION: NORMAL
LAB DIRECTOR NAME PROVIDER: NORMAL
RECOMMENDATION PATIENT DOC-IMP: NORMAL
REF LAB TEST METHOD: NORMAL
SERVICE CMNT-IMP: NORMAL
SPECIMEN SOURCE: NORMAL

## 2024-02-16 NOTE — TELEPHONE ENCOUNTER
Please contact patient in Azeri and explain that pap smear was slightly abnormal and + HPV, but no intervention indicated at this time.  Will just repeat pap smear next year.

## 2024-03-08 ENCOUNTER — ROUTINE PRENATAL (OUTPATIENT)
Dept: OBGYN CLINIC | Facility: CLINIC | Age: 24
End: 2024-03-08

## 2024-03-08 VITALS
HEIGHT: 64 IN | WEIGHT: 118 LBS | BODY MASS INDEX: 20.14 KG/M2 | HEART RATE: 76 BPM | DIASTOLIC BLOOD PRESSURE: 72 MMHG | SYSTOLIC BLOOD PRESSURE: 113 MMHG

## 2024-03-08 DIAGNOSIS — Z3A.18 18 WEEKS GESTATION OF PREGNANCY: ICD-10-CM

## 2024-03-08 DIAGNOSIS — Z34.92 PRENATAL CARE IN SECOND TRIMESTER: Primary | ICD-10-CM

## 2024-03-08 PROCEDURE — PNV: Performed by: NURSE PRACTITIONER

## 2024-03-08 NOTE — PATIENT INSTRUCTIONS
Maverick por jay confianza en nuestro equipo.   Le agradecemos y agradecemos bel comentarios.   Si recibe candice encuesta nuestra, tómese unos momentos para informarnos cómo estamos.   Sinceramente,  PHILIP Bhatti       SENALES CARITO EL EMBARAZO  Llame a nuestra oficina al [phone] para cualquiera de los siguientes:    1. sangrado vaginal  2. Dolor abdominal valentina que no desaparece.  3. Fiebre (más de 100.4 y no se marcos con Tylenol)  4. Vómitos persistentes que dominguez más de 24 horas.  5. dolor de pecho  6. Dolor o ardor al orinar.  7. Dolor de john severo que no se resuelve con Tylenol  8. Visión borrosa o zack puntos en jay visión.  9. Hinchazón repentina de jay dalton o raul.  10. Enrojecimiento, hinchazón o dolor en candice pierna.  11. Un aumento de peso repentino en pocos días.  12. Contar los movimientos fetales del bebé. (después de 28 semanas o el sexto mes de embarazo)  13. Candice pérdida de líquido acuoso de la vagina: puede ser un chorro, un goteo o candice humedad continua  14. Después de 20 semanas de embarazo, calambres rítmicos (más de 4 por hora) o menstruales jenise dolor bajo / pélvico

## 2024-03-08 NOTE — PROGRESS NOTES
Lakesha Chong presents today for routine OB visit at 18w6d.  Blood Pressure: 113/72  Wt=53.5 kg (118 lb); Body mass index is 20.15 kg/m².; TWG=3.629 kg (8 lb)  Fetal Heart Rate: 146  Abdomen: gravid, soft, non-tender.  She reports no complaints.  Denies uterine contractions or persistent cramping.  Denies vaginal bleeding or leaking of fluid.  Reports fetal movement.  Scheduled for ultrasound 3/26/24.  Reviewed common discomforts of pregnancy in second trimester and warning signs.  Advised to continue medications and return in 4 weeks.      Current Outpatient Medications   Medication Instructions    Prenatal Vit-Fe Fumarate-FA (PRENATAL VITAMIN PO) 1 tablet, Oral, Daily       Laboratory workup: initial OB labs (done 1/10/24)    Genetic Screening: NIPS negative, MSAFP (ordered 3/8/24)    Vaccinations: influenza (declined 1/16/24); Tdap (will offer after 27 weeks); RSV (not indicated outside RSV season); COVID-19 (rec'd 4/29/21 & 5/10/21 & 2/1/22)    Postpartum contraception: desires Nexplanon    Fetal Ultrasounds:  1/2/24 (9w0d) EDC confirmed  2/1/24 (13w5d) NT WNL      G1 Problems (from 01/02/24 to present)       Problem Noted Resolved    ASCUS pap/+ other HRHPV 2/15/2024 by PHILIP Bhatti No    Overview Addendum 2/16/2024  8:12 AM by PHILIP Bhatti     Repeat in 1 year         Not immune to HBV 2/9/2024 by PHILIP Bhatti No    Overview Addendum 2/9/2024  3:02 PM by PHILIP Bhatti     Referred to PCP

## 2024-03-26 ENCOUNTER — ROUTINE PRENATAL (OUTPATIENT)
Age: 24
End: 2024-03-26
Payer: COMMERCIAL

## 2024-03-26 ENCOUNTER — APPOINTMENT (OUTPATIENT)
Dept: LAB | Facility: HOSPITAL | Age: 24
End: 2024-03-26
Payer: COMMERCIAL

## 2024-03-26 VITALS
DIASTOLIC BLOOD PRESSURE: 60 MMHG | WEIGHT: 124.8 LBS | BODY MASS INDEX: 21.31 KG/M2 | HEART RATE: 69 BPM | SYSTOLIC BLOOD PRESSURE: 98 MMHG | HEIGHT: 64 IN

## 2024-03-26 DIAGNOSIS — Z34.92 PRENATAL CARE IN SECOND TRIMESTER: ICD-10-CM

## 2024-03-26 DIAGNOSIS — Z3A.21 21 WEEKS GESTATION OF PREGNANCY: Primary | ICD-10-CM

## 2024-03-26 PROCEDURE — 36415 COLL VENOUS BLD VENIPUNCTURE: CPT

## 2024-03-26 PROCEDURE — 99213 OFFICE O/P EST LOW 20 MIN: CPT | Performed by: OBSTETRICS & GYNECOLOGY

## 2024-03-26 PROCEDURE — 76805 OB US >/= 14 WKS SNGL FETUS: CPT | Performed by: OBSTETRICS & GYNECOLOGY

## 2024-03-26 PROCEDURE — 76817 TRANSVAGINAL US OBSTETRIC: CPT | Performed by: OBSTETRICS & GYNECOLOGY

## 2024-03-26 PROCEDURE — 82105 ALPHA-FETOPROTEIN SERUM: CPT

## 2024-03-26 NOTE — PROGRESS NOTES
A fetal ultrasound was completed. See Ob procedures in Epic for an interpretation and recommendations. Do not hesitate to contact us in [facility] if you have questions.    Kimani Pastrana MD, MSCE  Maternal Fetal Medicine

## 2024-03-26 NOTE — LETTER
March 26, 2024     Ashley Joshi MD  [address]  [address]  [place] [address]    Patient: Lakesha Chong   YOB: 2000   Date of Visit: 3/26/2024       Dear Dr. Joshi:    Thank you for referring Lakesha Chong to me for evaluation. Below are my notes for this consultation.    If you have questions, please do not hesitate to call me. I look forward to following your patient along with you.         Sincerely,        Kimani Pastrana MD        CC: No Recipients    Kimani Pastrana MD  3/26/2024 12:52 PM  Sign when Signing Visit  A fetal ultrasound was completed. See Ob procedures in Epic for an interpretation and recommendations. Do not hesitate to contact us in [facility] if you have questions.    Kimani Pastrana MD, MSCE  Maternal Fetal Medicine

## 2024-03-27 LAB
C TRACH DNA SPEC QL NAA+PROBE: NEGATIVE
N GONORRHOEA DNA SPEC QL NAA+PROBE: NEGATIVE

## 2024-03-28 LAB
2ND TRIMESTER 4 SCREEN SERPL-IMP: NORMAL
AFP ADJ MOM SERPL: 0.48
AFP INTERP AMN-IMP: NORMAL
AFP INTERP SERPL-IMP: NORMAL
AFP INTERP SERPL-IMP: NORMAL
AFP SERPL-MCNC: 39.9 NG/ML
AGE AT DELIVERY: 24.3 YR
GA METHOD: NORMAL
GA: 21.4 WEEKS
IDDM PATIENT QL: NO
MULTIPLE PREGNANCY: NO
NEURAL TUBE DEFECT RISK FETUS: NORMAL %

## 2024-04-05 ENCOUNTER — ROUTINE PRENATAL (OUTPATIENT)
Dept: OBGYN CLINIC | Facility: CLINIC | Age: 24
End: 2024-04-05

## 2024-04-05 VITALS
BODY MASS INDEX: 21.72 KG/M2 | SYSTOLIC BLOOD PRESSURE: 105 MMHG | HEART RATE: 93 BPM | HEIGHT: 64 IN | WEIGHT: 127.2 LBS | DIASTOLIC BLOOD PRESSURE: 69 MMHG

## 2024-04-05 DIAGNOSIS — Z3A.22 22 WEEKS GESTATION OF PREGNANCY: ICD-10-CM

## 2024-04-05 DIAGNOSIS — Z34.92 PRENATAL CARE IN SECOND TRIMESTER: Primary | ICD-10-CM

## 2024-04-05 PROCEDURE — PNV: Performed by: NURSE PRACTITIONER

## 2024-04-05 NOTE — PATIENT INSTRUCTIONS
Maverick por jay confianza en nuestro equipo.   Le agradecemos y agradecemos bel comentarios.   Si recibe candice encuesta nuestra, tómese unos momentos para informarnos cómo estamos.   Sinceramente,  PHILIP Bhatti       SENALES CARITO EL EMBARAZO  Llame a nuestra oficina al [phone] para cualquiera de los siguientes:    1. sangrado vaginal  2. Dolor abdominal valentina que no desaparece.  3. Fiebre (más de 100.4 y no se marcos con Tylenol)  4. Vómitos persistentes que dominguez más de 24 horas.  5. dolor de pecho  6. Dolor o ardor al orinar.  7. Dolor de john severo que no se resuelve con Tylenol  8. Visión borrosa o zack puntos en jay visión.  9. Hinchazón repentina de jay dalton o raul.  10. Enrojecimiento, hinchazón o dolor en candice pierna.  11. Un aumento de peso repentino en pocos días.  12. Contar los movimientos fetales del bebé. (después de 28 semanas o el sexto mes de embarazo)  13. Candice pérdida de líquido acuoso de la vagina: puede ser un chorro, un goteo o candice humedad continua  14. Después de 20 semanas de embarazo, calambres rítmicos (más de 4 por hora) o menstruales jenise dolor bajo / pélvico

## 2024-04-05 NOTE — PROGRESS NOTES
Lakesha Chong presents today for routine OB visit at 22w6d.  Blood Pressure: 105/69  Wt=57.7 kg (127 lb 3.2 oz); Body mass index is 21.83 kg/m².; TWG=7.802 kg (17 lb 3.2 oz)  Fetal Heart Rate: 134; Fundal Height (cm): 22 cm  Abdomen: gravid, soft, non-tender.  She reports no complaints.  Denies uterine contractions or persistent cramping.  Denies vaginal bleeding or leaking of fluid.  Reports fetal movement.  Scheduled for ultrasound 7/2/24.  Reviewed common discomforts of pregnancy in second trimester and warning signs.  Advised to continue medications and return in 4 weeks.      Current Outpatient Medications   Medication Instructions    Prenatal Vit-Fe Fumarate-FA (PRENATAL VITAMIN PO) 1 tablet, Oral, Daily       Laboratory workup: initial OB labs (done 1/10/24)    Genetic Screening: NIPS negative, MSAFP negative    Vaccinations: influenza (declined 1/16/24); Tdap (will offer after 27 weeks); RSV (not indicated outside RSV season); COVID-19 (rec'd 4/29/21 & 5/10/21 & 2/1/22)    Postpartum contraception: desires Nexplanon    Fetal Ultrasounds:  1/2/24 (9w0d) EDC confirmed  2/1/24 (13w5d) NT WNL  3/6/24 (21w3d) no previa, fermin WNL & complete      G1 Problems (from 01/02/24 to present)       Problem Noted Resolved    ASCUS pap/+ other HRHPV 2/15/2024 by PHILIP Bhatti No    Overview Addendum 2/16/2024  8:12 AM by PHILIP Bhatti     Repeat in 1 year         Not immune to HBV 2/9/2024 by PHILIP Bhatti No    Overview Addendum 2/9/2024  3:02 PM by PHILIP Bhatti     Referred to PCP

## 2024-05-03 ENCOUNTER — ROUTINE PRENATAL (OUTPATIENT)
Dept: OBGYN CLINIC | Facility: CLINIC | Age: 24
End: 2024-05-03

## 2024-05-03 VITALS
DIASTOLIC BLOOD PRESSURE: 71 MMHG | BODY MASS INDEX: 22.64 KG/M2 | HEIGHT: 64 IN | WEIGHT: 132.6 LBS | HEART RATE: 76 BPM | SYSTOLIC BLOOD PRESSURE: 105 MMHG

## 2024-05-03 DIAGNOSIS — Z34.92 PRENATAL CARE IN SECOND TRIMESTER: Primary | ICD-10-CM

## 2024-05-03 DIAGNOSIS — Z3A.26 26 WEEKS GESTATION OF PREGNANCY: ICD-10-CM

## 2024-05-03 PROCEDURE — PNV: Performed by: NURSE PRACTITIONER

## 2024-05-03 NOTE — PROGRESS NOTES
Lakesha Chong presents today for routine OB visit at 26w6d.  Blood Pressure: 105/71  Wt=60.1 kg (132 lb 9.6 oz); Body mass index is 22.76 kg/m².; TWG=10.3 kg (22 lb 9.6 oz)  Fetal Heart Rate: 145; Fundal Height (cm): 27 cm  Abdomen: gravid, soft, non-tender.  She reports no complaints.  Reports rare mild uterine contractions or persistent cramping.  Denies vaginal bleeding or leaking of fluid.  Reports fetal movement.  Scheduled for ultrasound 7/2/24.  Reviewed common discomforts of pregnancy in second trimester and warning signs.  Advised to continue medications and return in 2 weeks.      Current Outpatient Medications   Medication Instructions    Prenatal Vit-Fe Fumarate-FA (PRENATAL VITAMIN PO) 1 tablet, Oral, Daily       Laboratory workup: initial OB labs (done 1/10/24)    Genetic Screening: NIPS negative, MSAFP negative    Vaccinations: influenza (declined 1/16/24); Tdap (will offer after 27 weeks); RSV (not indicated outside RSV season); COVID-19 (rec'd 4/29/21 & 5/10/21 & 2/1/22)    Postpartum contraception: desires Nexplanon    Fetal Ultrasounds:  1/2/24 (9w0d) EDC confirmed  2/1/24 (13w5d) NT WNL  3/26/24 (21w3d) no previa, fermin WNL & complete      G1 Problems (from 01/02/24 to present)       Problem Noted Resolved    ASCUS pap/+ other HRHPV 2/15/2024 by PHILIP Bhatti No    Overview Addendum 2/16/2024  8:12 AM by PHILIP Bhatti     Repeat in 1 year         Not immune to HBV 2/9/2024 by PHILIP Bhatti No    Overview Addendum 5/3/2024 10:27 AM by PHILIP Bhatti     Needs HBV vaccine booster

## 2024-05-03 NOTE — PATIENT INSTRUCTIONS
Maverick por jay confianza en nuestro equipo.   Le agradecemos y agradecemos bel comentarios.   Si recibe candice encuesta nuestra, tómese unos momentos para informarnos cómo estamos.   Sinceramente,  PHILIP Bhatti       SENALES CARITO EL EMBARAZO  Llame a nuestra oficina al [phone] para cualquiera de los siguientes:    1. sangrado vaginal  2. Dolor abdominal valentina que no desaparece.  3. Fiebre (más de 100.4 y no se marcos con Tylenol)  4. Vómitos persistentes que dominguez más de 24 horas.  5. dolor de pecho  6. Dolor o ardor al orinar.  7. Dolor de john severo que no se resuelve con Tylenol  8. Visión borrosa o zack puntos en jay visión.  9. Hinchazón repentina de jay dalton o raul.  10. Enrojecimiento, hinchazón o dolor en candice pierna.  11. Un aumento de peso repentino en pocos días.  12. Contar los movimientos fetales del bebé. (después de 28 semanas o el sexto mes de embarazo)  13. Candice pérdida de líquido acuoso de la vagina: puede ser un chorro, un goteo o candice humedad continua  14. Después de 20 semanas de embarazo, calambres rítmicos (más de 4 por hora) o menstruales jenise dolor bajo / pélvico

## 2024-05-17 ENCOUNTER — ROUTINE PRENATAL (OUTPATIENT)
Dept: OBGYN CLINIC | Facility: CLINIC | Age: 24
End: 2024-05-17

## 2024-05-17 VITALS
DIASTOLIC BLOOD PRESSURE: 67 MMHG | HEART RATE: 76 BPM | WEIGHT: 134.4 LBS | BODY MASS INDEX: 22.94 KG/M2 | HEIGHT: 64 IN | SYSTOLIC BLOOD PRESSURE: 105 MMHG

## 2024-05-17 DIAGNOSIS — Z3A.28 28 WEEKS GESTATION OF PREGNANCY: ICD-10-CM

## 2024-05-17 DIAGNOSIS — Z23 ENCOUNTER FOR IMMUNIZATION: Primary | ICD-10-CM

## 2024-05-17 DIAGNOSIS — Z34.93 PRENATAL CARE IN THIRD TRIMESTER: ICD-10-CM

## 2024-05-17 PROCEDURE — 90471 IMMUNIZATION ADMIN: CPT | Performed by: NURSE PRACTITIONER

## 2024-05-17 PROCEDURE — 90715 TDAP VACCINE 7 YRS/> IM: CPT | Performed by: NURSE PRACTITIONER

## 2024-05-17 PROCEDURE — PNV: Performed by: NURSE PRACTITIONER

## 2024-05-17 NOTE — PROGRESS NOTES
28 week teaching given to patient. Tdap also given in left deltoid on 5/17/2024. Breast pump was not ordered because patient states she already has a pump.     NDC: 76701-540-23  LOT: [id number]  EXP: 02/10/2026

## 2024-05-17 NOTE — PATIENT INSTRUCTIONS
Maverick por jay confianza en nuestro equipo.   Le agradecemos y agradecemos bel comentarios.   Si recibe brandon encuesta nuestra, tómese unos momentos para informarnos cómo estamos.   Sinceramente,  GUMARO BhattiNP       El Tercer Trimestre  (28-42 semanas)   JAY BEBÉ   ·        jay bebé chupa jay dedo ahora!   ·        jay bebé puede oír voces y responder al tacto... habla con él o robyn!!   ·        el cerebro de jay bebé crece y se desarrolla más en los últimos 2 meses de embarazo   ·        john y huesos del bebé son suaves y flexibles para que quepan por el canal del parto   ·        los movimientos del bebé cambian hacia el final del embarazo porque hay menos espacio para patear y estiramientos en tu vientre   ·        los pulmones del bebé no están completamente desarrollados y totalmente preparados para respirar por bel propios hasta el último 3-4 semanas antes de jya fecha de vencimiento     TU CUERPO   ·        jay vientre está creciendo mucho ahora   ·        será más difícil dormir bianca de noche o ser tan activos jenise eres generalmente   ·        puede sudar más de lo habitual   ·        serás más desequilibrado... tenga cuidado de no caer!   ·        Usted puede desarrollar hemorroides (qué pueden ser dolorosas y hacen difícil sentarse)   ·        los dos últimos meses del embarazo puede ser muy incómodos con sabina de espalda, sabina de john y ardor de estómago   ·        Puedes empezar a tener contracciones... mientras son irregulares y menos de 5 por hora, esto es brandon parte normal de jay cuerpo a punto de tener un bebé   ·        el jl uterino puede empezar a dilatar y abrir arriba... para estar listo para el parto   ·        Usted puede encontrarse que necesitan hacer orinar muy a menudo... porque el bebé está presionando mucho la vejiga   ·        Usted puede quedarse corta de respiracion más rápido de lo masoud          CUENTAS DEL RETROCESO FETAL    En el tercer trimestre (después de 28  semanas de gestación) deben realizar patada fetal cuentas cada día. Hylton bebé debe moverse al menos 10 veces en 2 horas uriah un tiempo activo, brandon vez al día.    Elegir el atime del día cuando el bebé es más activo. Trate de hacerlo a la misma hora cada día. Entrar en brandon posición cómoda y luego escribir el tiempo que tu bebé se mueve dany. Cuenta cada movimiento hasta que el bebé se mueve 10 veces. Estos movimientos incluyen patadas, puñetazos, codazos, revolotea o rollos. Allgood puede tardar entre 5 minutos a 2 horas. Anote el tiempo que sientes movimiento 10 del bebé.    Si ha pasado 2 horas y tu bebé no se ha movido al menos 10 veces, usted debe llamar a la oficina de inmediato. [phone]          CAROL DE PARTO PREMATURO     Cuando llamar a [phone]:  * Tengo que llamar inmediatamente si tengo incluso brandon pequeña cantidad de LIQUIDO de mi vagina, con o sin contracciones.   * Tengo que llamar si estoy SANGRADO de mi vagina.   * Tengo que llamar si tengo COLICOS que continúa después de beber 2 ó 3 vasos de agua y acostados en mi lado uriah brandon hora y que se siente jenise que estoy teniendo un período.   * Tengo que llamar si siento CONTRACCIONES más de 4 veces en brandon hora quando siento que mi mitra se mantiene dura después de que trato de beber 2-3 vasos del agua y acostarme en mi lado uriah brandon hora.   * Tengo que llamar si michaela un cambio de mi FLUJO vaginal.   * Tengo que llamar si siento la [place] que siente que el bebé aprieta en mi vagina y dura más de brandon hora.   * Tengo que llamar si tengo DOLOR BAJO DE LA ESPALDA que es nueva y está cerca de mi cóccix. Puede entrar y salir varias veces uriah brandon hora o quedarse allí constantemente.         LA PRE-ECLAMPSIA    ¿Qué es? La preeclampsia es brandon enfermedad grave que puede ocurrir uriah el embarazo se relaciona con la presión arterial cathy. Le puede pasar a cualquier bhupinder.     ¿Por qué Yes importarme? Las mujeres Care preeclampsia  tienen riesgos graves pueden incluir convulsiones, trazo, daños en los órganos, nacimiento prematuro de jay bebé. En los peor de los casos, puede causar la muerte de la madre y jay bebé.     ¿Qué lisha pagar atención brandon? Signos y síntomas de la preeclampsia pueden incluir:   * Inflamación severa raul de la dalton o las   * Dolor de john todavía presente después de deya tomado Tylenol   * Kameron manchas o cambios en Lavista   * Dolor en la parte superior del abdomen o hombro   * Bucklin náuseas y vómitos (en la segunda mitad del embarazo)   * Aumento de peso repentino   * Dificultad para respirar     ¿Qué lisha hacer? Si usted experimenta alguno de los síntomas de la preeclampsia, comuníquese con jay proveedor de OB. Encontrar la preeclampsia temprana es importante para usted y jay bebé. Llámanos al [phone].          LACTANCIA MATERNA     BENEFICIOS PARA LOS BEBÉS   ·       sistemas inmunológicos más amanda (menos alergias, eczema, asma y cáncer infantil)   ·       menos diarrea y estreñimiento u otras enfermedades GI   ·       menos resfriados e infecciones del oído   ·       mejor visión y dientes (menos cavidades)   ·       mejora el IQ   ·       menores tasas de diabetes y obesidad en la infancia     BENEFICIOS PARA LAS MADRES   ·        promueve la pérdida de peso más rápida después del parto   ·        lian riesgo para la depresión postparto   ·        lian riesgo para los cánceres de mama, útero y ovario   ·        lian riesgo para la osteoporosis en desarrollo con la edad   ·        siempre es más fácil que fórmula - correcto, limpio, y la temperatura adecuada   ·        menos costosa que la fórmula es gratis!!!     CLAVES PARA BRANDON LACTANCIA EXITOSA   ·        mantener bebé piel a piel hasta después de la primera alimentación evento   ·        tener a bebé en jay cuarto con usted uriah jay estadía en el hospital después del parto   ·        evitar cualquier botella de alimentación (a menos que sea  médicamente necesario)   ·        limitar el uso de chupones y pañales   ·        Pida ayuda si usted está teniendo problemas... consultores de lactancia (que se especializan en la lactancia) están disponibles para ayudarle a   ·        brandon dieta saludable para mamá... comiendo brandon variedad de alimentos y raciones con moderación     COSAS QUE DEBES SABER SOBRE LA LACTANCIA   ·        mayoría de los medicamentos se considera compatible con la lactancia materna por la [employer], christopher puedes consultarlo con jay médico o en lactancia antes de laila un medicamento nuevo... para estar seguro es seguro   ·        alcohol (cerveza, vino, licor) puede transmitirse de la madre al bebé a través de la leche materna... un ocasional, social bebida es considerado aceptable por la [employer]... más que eso deben evitarse   ·        la lactancia materna es NO es un método para evitar embarazo   ·        nicotina (cigarrillos) puede pasar de la madre al bebé a través de la leche materna... sin embargo, para las madres que fuman, es aún más saludable para amamantar que use la fórmula   ·        cafeína debería limitarse a 1-3 tazas por día... incluye café, refrescos, bebidas energéticas           MASAJE EN LA TERESITA PERINEAL O VAGINAL    Que puedo hacer ahora para reducir las probabilidades de desgarro uriah el parto?  Masaje alrededor del orificio de la vagina realizado por usted misma (o por jay alex).  El masaje en esta teresita, ya sea antes del parto o uriah la segunda etapa del parto, puede reducir la probabilidad de desgarro perineal uriah el parto.  Asimismo, el uso de compresas tibias en el perineo uriah la etapa expulsiva del parto puede reducir la pravedad del desparro.  Kalida sucedera uriah la etapa expulsiva del parto.  En casa tambien puede reducir las probabilidades de sufrir lesiones durnate el parto de con masajes en la teresita perineal.    Quien?  Todas las mujeres  embarazadas a partir de, aproximadamente, las 34 semanas de embarazo.    Cuando?  Usted o jay alex deben hacer masajes en la bayron vaginal carito 5 a 10 minutos de 1 a 4 veces por semana.    Edna?  Use brandon mezcla de agua y aceite de almendras, earl u corley con 1 o 2 dedos (gary la comodidad).  Inserte los dedos en la vagina entre 3 y 5cm.  Aplique presion general hacia abajo y hacia los costados carito 5 a 10 minutos entre las 3 y las 9 horas, de 1 a 4 veces por semana.          SENALES CARITO EL EMBARAZO  Llame a nuestra oficina al [phone] para cualquiera de los siguientes:    1. Sangrado vaginal  2. Dolor abdominal valentina que no desaparece.  3. Fiebre (más de 100.4 y no se marcos con Tylenol)  4. Vómitos persistentes que dominguez más de 24 horas.  5. dolor de pecho  6. Dolor o ardor al orinar.  7. Dolor de john severo que no se resuelve con Tylenol  8. Visión borrosa o zack puntos en jay visión.  9. Hinchazón repentina de jay dalton o raul.  10. Enrojecimiento, hinchazón o dolor en brandon pierna.  11. Un aumento de peso repentino en pocos días.  12. Contar los movimientos fetales del bebé. (después de 28 semanas o el sexto mes de embarazo)  13. Brandon pérdida de líquido acuoso de la vagina: puede ser un chorro, un goteo o brandon humedad continua  14. Después de 20 semanas de embarazo, calambres rítmicos (más de 4 por hora) o menstruales edna dolor bajo / pélvico          VACUNAS CARITO EL EMBARAZO    TDAP  La tos ferina (o tos ferina) puede ser grave para cualquier persona, christopher para jay recién nacido, puede ser mortal. Hasta 20 recién nacidos mueren cada año en los [place] debido a la tos ferina. Aproximadamente la mitad de los bebés menores de 1 año de edad que contraen tos ferina necesitan tratamiento en el hospital. Cuanto más joven es el bebé cuando él o robyn son la tos ferina, más probable es que él o robyn tendrá que ser tratado en un hospital. Cuando usted recibe la vacuna contra la tos ferina (Tdap)  uriah jay embarazo, jay cuerpo creará anticuerpos protectores y algunos de ellos pasan a jay bebé antes de nacer. Estos anticuerpos pueden ayudar a proteger a jay bebé contraigan la tos ferina hasta que tienen edad suficiente para recibir la vacuna ellos mismos (generalmente alrededor de 6 meses de edad).      INFLUENZA  Cambios en las funciones inmunológica, del corazón y pulmón uriah el embarazo te hacen más susceptible a padecer seriamente enfermo de la gripe. Coger la gripe también aumenta las posibilidades de problemas graves para jay bebé en desarrollo, incluyendo la entrega y el trabajo de parto prematuro. Se recomienda que todas las mujeres que están embarazadas uriah la temporada de gripe deben recibir brandon vacuna contra la influenza.

## 2024-05-17 NOTE — PROGRESS NOTES
Lakesha Chong presents today for routine OB visit at 28w6d.  Blood Pressure: 105/67  Wt=61 kg (134 lb 6.4 oz); Body mass index is 23.07 kg/m².; TWG=11.1 kg (24 lb 6.4 oz)  Fetal Heart Rate: 142; Fundal Height (cm): 29 cm  Abdomen: gravid, soft, non-tender.  She reports no complaints.  Reports rare mild uterine contractions.  Denies vaginal bleeding or leaking of fluid.  Reports adequate fetal movement of at least 10 movements in 2 hours once daily.  Scheduled for ultrasound 7/2/24.  Reviewed premature labor precautions and fetal kick counts.  Advised to continue medications and return in 2 weeks.      Current Outpatient Medications   Medication Instructions    Prenatal Vit-Fe Fumarate-FA (PRENATAL VITAMIN PO) 1 tablet, Oral, Daily       Laboratory workup: initial OB labs (done 1/10/24); 28 week labs (ordered 5/17/24)    Genetic Screening: NIPS negative, MSAFP negative    Vaccinations: influenza (declined 1/16/24); Tdap (given 5/17/24); RSV (not indicated outside RSV season); COVID-19 (rec'd 4/29/21 & 5/10/21 & 2/1/22)    Postpartum contraception: desires Nexplanon    Fetal Ultrasounds:  1/2/24 (9w0d) EDC confirmed  2/1/24 (13w5d) NT WNL  3/26/24 (21w3d) no previa, fermin WNL & complete      G1 Problems (from 01/02/24 to present)       Problem Noted Resolved    ASCUS pap/+ other HRHPV 2/15/2024 by PHILIP Bhatti No    Overview Addendum 2/16/2024  8:12 AM by PHILIP Bhatti     Repeat in 1 year         Not immune to HBV 2/9/2024 by PHILIP Bhatti No    Overview Addendum 5/3/2024 10:27 AM by PHILIP Bhatti     Needs HBV vaccine booster                      Patient was put on cpap around 7. Patient rested on cpap and was taken off at 430 to start the day on trache collar.

## 2024-05-30 ENCOUNTER — APPOINTMENT (OUTPATIENT)
Dept: LAB | Facility: HOSPITAL | Age: 24
End: 2024-05-30
Payer: COMMERCIAL

## 2024-05-30 DIAGNOSIS — Z34.91 PRENATAL CARE IN FIRST TRIMESTER: ICD-10-CM

## 2024-05-30 DIAGNOSIS — Z3A.11 11 WEEKS GESTATION OF PREGNANCY: ICD-10-CM

## 2024-05-30 DIAGNOSIS — Z31.430 ENCOUNTER OF FEMALE FOR TESTING FOR GENETIC DISEASE CARRIER STATUS FOR PROCREATIVE MANAGEMENT: ICD-10-CM

## 2024-05-30 DIAGNOSIS — Z34.93 PRENATAL CARE IN THIRD TRIMESTER: ICD-10-CM

## 2024-05-30 LAB
ERYTHROCYTE [DISTWIDTH] IN BLOOD BY AUTOMATED COUNT: 12.7 % (ref 11.6–15.1)
GLUCOSE 1H P 50 G GLC PO SERPL-MCNC: 113 MG/DL (ref 70–134)
HCT VFR BLD AUTO: 34.8 % (ref 34.8–46.1)
HGB BLD-MCNC: 11.7 G/DL (ref 11.5–15.4)
MCH RBC QN AUTO: 30 PG (ref 26.8–34.3)
MCHC RBC AUTO-ENTMCNC: 33.6 G/DL (ref 31.4–37.4)
MCV RBC AUTO: 89 FL (ref 82–98)
PLATELET # BLD AUTO: 274 THOUSANDS/UL (ref 149–390)
PMV BLD AUTO: 10.3 FL (ref 8.9–12.7)
RBC # BLD AUTO: 3.9 MILLION/UL (ref 3.81–5.12)
TREPONEMA PALLIDUM IGG+IGM AB [PRESENCE] IN SERUM OR PLASMA BY IMMUNOASSAY: NORMAL
WBC # BLD AUTO: 9.68 THOUSAND/UL (ref 4.31–10.16)

## 2024-05-30 PROCEDURE — 36415 COLL VENOUS BLD VENIPUNCTURE: CPT

## 2024-05-30 PROCEDURE — 85027 COMPLETE CBC AUTOMATED: CPT

## 2024-05-30 PROCEDURE — 82950 GLUCOSE TEST: CPT

## 2024-05-30 PROCEDURE — 86780 TREPONEMA PALLIDUM: CPT

## 2024-05-31 ENCOUNTER — TELEPHONE (OUTPATIENT)
Dept: OBGYN CLINIC | Facility: CLINIC | Age: 24
End: 2024-05-31

## 2024-06-06 ENCOUNTER — ROUTINE PRENATAL (OUTPATIENT)
Dept: OBGYN CLINIC | Facility: CLINIC | Age: 24
End: 2024-06-06

## 2024-06-06 VITALS
BODY MASS INDEX: 23.22 KG/M2 | HEART RATE: 112 BPM | DIASTOLIC BLOOD PRESSURE: 66 MMHG | SYSTOLIC BLOOD PRESSURE: 99 MMHG | WEIGHT: 136 LBS | HEIGHT: 64 IN

## 2024-06-06 DIAGNOSIS — Z3A.31 31 WEEKS GESTATION OF PREGNANCY: Primary | ICD-10-CM

## 2024-06-06 PROCEDURE — PNV: Performed by: OBSTETRICS & GYNECOLOGY

## 2024-06-06 NOTE — PROGRESS NOTES
[facility]   PRENATAL VISIT  Name: Lakesha Chong  MRN: [id number]  : 2000      ASSESSMENT/PLAN:  Problem List       28 weeks gestation of pregnancy    Not immune to HBV    Overview     Needs HBV vaccine booster         ASCUS pap/+ other HRHPV    Overview     Repeat in 1 year            16-18 weeks: sequential screening, level II ultrasound order, flu vaccine  26-28 weeks: 28 week labs (CBC, RPR, 1hr GTT), Rh status/rhogam, FKC, flu vaccine  28-32 weeks: tdap, flu vaccine  32 weeks: tdap, flu vaccine, check in card, rediscuss contraception, birth plan  36 weeks: collect GBS/PCN allergy?, flu vaccine      SUBJECTIVE 24 y.o.  31w5d here for PN visit. She *** contractions. She *** leakage of fluid and vaginal bleeding. She *** good fetal movement. Her pregnancy is ***complicated***.     OBJECTIVE:  There were no vitals filed for this visit.    Physical Exam            Pregnancy Plan:  Pregnancy: Carrizales  Fetal sex: Female     Delivery Plans  Deliver by GA (weeks): 42  Planned delivery method: Vaginal  Planned delivery location: [facility]  Planned anesthesia: None  Acceptable blood products: All     Post-Delivery Plans  Feeding intentions: Breast Milk  Planned birth control: Implant      Future Appointments   Date Time Provider Department Center   2024  2:30 PM PHILIP Bhatti [facility]   2024  9:45 AM  [facility]         Patricia Mares MD  OB/GYN PGY-3  2024  4:35 PM

## 2024-06-06 NOTE — PATIENT INSTRUCTIONS
El Tercer Trimestre  (28-42 semanas)   JAY BEBÉ   ·        jay bebé chupa jay dedo ahora!   ·        jay bebé puede oír voces y responder al tacto... habla con él o robyn!!   ·        el cerebro de jay bebé crece y se desarrolla más en los últimos 2 meses de embarazo   ·        john y huesos del bebé son suaves y flexibles para que quepan por el canal del parto   ·        los movimientos del bebé cambian hacia el final del embarazo porque hay menos espacio para patear y estiramientos en tu vientre   ·        los pulmones del bebé no están completamente desarrollados y totalmente preparados para respirar por bel propios hasta el último 3-4 semanas antes de jay fecha de vencimiento     TU CUERPO   ·        jay vientre está creciendo mucho ahora   ·        será más difícil dormir bianca de noche o ser tan activos jenise eres generalmente   ·        puede sudar más de lo habitual   ·        serás más desequilibrado... tenga cuidado de no caer!   ·        Usted puede desarrollar hemorroides (qué pueden ser dolorosas y hacen difícil sentarse)   ·        los dos últimos meses del embarazo puede ser muy incómodos con sabina de espalda, sabina de john y ardor de estómago   ·        Puedes empezar a tener contracciones... mientras son irregulares y menos de 5 por hora, esto es brandon parte normal de jay cuerpo a punto de tener un bebé   ·        el jl uterino puede empezar a dilatar y abrir arriba... para estar listo para el parto   ·        Usted puede encontrarse que necesitan hacer orinar muy a menudo... porque el bebé está presionando mucho la vejiga   ·        Usted puede quedarse corta de respiracion más rápido de lo masoud          CUENTAS DEL RETROCESO FETAL    En el tercer trimestre (después de 28 semanas de gestación) deben realizar patada fetal cuentas cada día. Jay bebé debe moverse al menos 10 veces en 2 horas uriah un tiempo activo, brandon vez al día.    Elegir el atime del día cuando el bebé es más activo. Trate de  hacerlo a la misma hora cada día. Entrar en brandon posición cómoda y luego escribir el tiempo que tu bebé se mueve dany. Cuenta cada movimiento hasta que el bebé se mueve 10 veces. Estos movimientos incluyen patadas, puñetazos, codazos, revolotea o rollos. Almyra puede tardar entre 5 minutos a 2 horas. Anote el tiempo que sientes movimiento 10 del bebé.    Si ha pasado 2 horas y tu bebé no se ha movido al menos 10 veces, usted debe llamar a la oficina de inmediato. [phone]          CAROL DE PARTO PREMATURO     Cuando llamar a [phone]:  * Tengo que llamar inmediatamente si tengo incluso brandon pequeña cantidad de LIQUIDO de mi vagina, con o sin contracciones.   * Tengo que llamar si estoy SANGRADO de mi vagina.   * Tengo que llamar si tengo COLICOS que continúa después de beber 2 ó 3 vasos de agua y acostados en mi lado uriah brandon hora y que se siente jenise que estoy teniendo un período.   * Tengo que llamar si siento CONTRACCIONES más de 4 veces en brandon hora quando siento que mi mitra se mantiene dura después de que trato de beber 2-3 vasos del agua y acostarme en mi lado uriah brandon hora.   * Tengo que llamar si michaela un cambio de mi FLUJO vaginal.   * Tengo que llamar si siento la [place] que siente que el bebé aprieta en mi vagina y dura más de brandon hora.   * Tengo que llamar si tengo DOLOR BAJO DE LA ESPALDA que es nueva y está cerca de mi cóccix. Puede entrar y salir varias veces uriah brandon hora o quedarse allí constantemente.         LA PRE-ECLAMPSIA    ¿Qué es? La preeclampsia es brandon enfermedad grave que puede ocurrir uriah el embarazo se relaciona con la presión arterial cathy. Le puede pasar a cualquier bhuipnder.     ¿Por qué Yes importarme? Las mujeres Care preeclampsia tienen riesgos graves pueden incluir convulsiones, trazo, daños en los órganos, nacimiento prematuro de jay bebé. En los peor de los casos, puede causar la muerte de la madre y jay bebé.     ¿Qué lisha pagar atención brandon? Signos y  síntomas de la preeclampsia pueden incluir:   * Inflamación severa raul de la dalton o las   * Dolor de john todavía presente después de deya tomado Tylenol   * Kameron manchas o cambios en [place]   * Dolor en la parte superior del abdomen o hombro   * [place] náuseas y vómitos (en la segunda mitad del embarazo)   * Aumento de peso repentino   * Dificultad para respirar     ¿Qué lisha hacer? Si usted experimenta alguno de los síntomas de la preeclampsia, comuníquese con jay proveedor de OB. Encontrar la preeclampsia temprana es importante para usted y jay bebé. Llámanos al [phone].          LACTANCIA MATERNA     BENEFICIOS PARA LOS BEBÉS   ·       sistemas inmunológicos más amanda (menos alergias, eczema, asma y cáncer infantil)   ·       menos diarrea y estreñimiento u otras enfermedades GI   ·       menos resfriados e infecciones del oído   ·       mejor visión y dientes (menos cavidades)   ·       mejora el IQ   ·       menores tasas de diabetes y obesidad en la infancia     BENEFICIOS PARA LAS MADRES   ·        promueve la pérdida de peso más rápida después del parto   ·        lian riesgo para la depresión postparto   ·        lian riesgo para los cánceres de mama, útero y ovario   ·        lian riesgo para la osteoporosis en desarrollo con la edad   ·        siempre es más fácil que fórmula - correcto, limpio, y la temperatura adecuada   ·        menos costosa que la fórmula es gratis!!!     CLAVES PARA ALINE LACTANCIA EXITOSA   ·        mantener bebé piel a piel hasta después de la primera alimentación evento   ·        tener a bebé en jay cuarto con usted uriah jay estadía en el hospital después del parto   ·        evitar cualquier botella de alimentación (a menos que sea médicamente necesario)   ·        limitar el uso de chupones y pañales   ·        Pida ayuda si usted está teniendo problemas... consultores de lactancia (que se especializan en la lactancia) están disponibles para ayudarle a   ·         brandon dieta saludable para mamá... comiendo brandon variedad de alimentos y raciones con moderación     COSAS QUE DEBES SABER SOBRE LA LACTANCIA   ·        mayoría de los medicamentos se considera compatible con la lactancia materna por la [employer], christopher puedes consultarlo con jay médico o en lactancia antes de laila un medicamento nuevo... para estar seguro es seguro   ·        alcohol (cerveza, vino, licor) puede transmitirse de la madre al bebé a través de la leche materna... un ocasional, social bebida es considerado aceptable por la [employer]... más que eso deben evitarse   ·        la lactancia materna es NO es un método para evitar embarazo   ·        nicotina (cigarrillos) puede pasar de la madre al bebé a través de la leche materna... sin embargo, para las madres que fuman, es aún más saludable para amamantar que use la fórmula   ·        cafeína debería limitarse a 1-3 tazas por día... incluye café, refrescos, bebidas energéticas           MASAJE EN LA TERESITA PERINEAL O VAGINAL    Que puedo hacer ahora para reducir las probabilidades de desgarro uriah el parto?  Masaje alrededor del orificio de la vagina realizado por usted misma (o por jay alex).  El masaje en esta teresita, ya sea antes del parto o uriah la segunda etapa del parto, puede reducir la probabilidad de desgarro perineal uriah el parto.  Asimismo, el uso de compresas tibias en el perineo uriah la etapa expulsiva del parto puede reducir la pravedad del desparro.  [place] sucedera uriah la etapa expulsiva del parto.  En casa tambien puede reducir las probabilidades de sufrir lesiones durnate el parto de con masajes en la teresita perineal.    Quien?  Todas las mujeres embarazadas a partir de, aproximadamente, las 34 semanas de embarazo.    Cuando?  Usted o jay alex deben hacer masajes en la teresita vaginal uriah 5 a 10 minutos de 1 a 4 veces por semana.    Universal?  Use brandon mezcla de agua y aceite de almendras,  earl u corley con 1 o 2 dedos (gary la comodidad).  Inserte los dedos en la vagina entre 3 y 5cm.  Aplique presion general hacia abajo y hacia los costados carito 5 a 10 minutos entre las 3 y las 9 horas, de 1 a 4 veces por semana.          SENALES CARITO EL EMBARAZO  Llame a nuestra oficina al [phone] para cualquiera de los siguientes:    1. Sangrado vaginal  2. Dolor abdominal valentina que no desaparece.  3. Fiebre (más de 100.4 y no se marcos con Tylenol)  4. Vómitos persistentes que dominguez más de 24 horas.  5. dolor de pecho  6. Dolor o ardor al orinar.  7. Dolor de john severo que no se resuelve con Tylenol  8. Visión borrosa o zack puntos en jay visión.  9. Hinchazón repentina de jay dalton o raul.  10. Enrojecimiento, hinchazón o dolor en brandon pierna.  11. Un aumento de peso repentino en pocos días.  12. Contar los movimientos fetales del bebé. (después de 28 semanas o el sexto mes de embarazo)  13. Brandon pérdida de líquido acuoso de la vagina: puede ser un chorro, un goteo o brandon humedad continua  14. Después de 20 semanas de embarazo, calambres rítmicos (más de 4 por hora) o menstruales jenise dolor bajo / pélvico          VACUNAS CARITO EL EMBARAZO    TDAP  La tos ferina (o tos ferina) puede ser grave para cualquier persona, christopher para jay recién nacido, puede ser mortal. Hasta 20 recién nacidos mueren cada año en los [place] debido a la tos ferina. Aproximadamente la mitad de los bebés menores de 1 año de edad que contraen tos ferina necesitan tratamiento en el hospital. Cuanto más joven es el bebé cuando él o robyn son la tos ferina, más probable es que él o robyn tendrá que ser tratado en un hospital. Cuando usted recibe la vacuna contra la tos ferina (Tdap) carito jay embarazo, jay cuerpo creará anticuerpos protectores y algunos de ellos pasan a jay bebé antes de nacer. Estos anticuerpos pueden ayudar a proteger a jay bebé contraigan la tos ferina hasta que tienen edad suficiente para recibir la vacuna  ellos mismos (generalmente alrededor de 6 meses de edad).      INFLUENZA  Cambios en las funciones inmunológica, del corazón y pulmón uriah el embarazo te hacen más susceptible a padecer seriamente enfermo de la gripe. Coger la gripe también aumenta las posibilidades de problemas graves para jay bebé en desarrollo, incluyendo la entrega y el trabajo de parto prematuro. Se recomienda que todas las mujeres que están embarazadas uriah la temporada de gripe deben recibir brandon vacuna contra la influenza.

## 2024-06-06 NOTE — PROGRESS NOTES
Lakesha Chong presents today for routine OB visit at 31w5d.  Blood Pressure: 99/66  Wt=61.7 kg (136 lb); Body mass index is 23.34 kg/m².; TWG=11.8 kg (26 lb)   ;    Abdomen: gravid, soft, non-tender.  She reports no c/o. Occasional elizabet cisneros ctxn.  Denies strong uterine contractions.  Denies vaginal bleeding or leaking of fluid.  Reports adequate fetal movement of at least 10 movements in 2 hours once daily.  /Scheduled for ultrasound 7/2.  Reviewed premature labor precautions and fetal kick counts.  Advised to continue medications and return in 2 weeks.    Pt reaffirmed plan for Nexplanon as postpartum contraception. [facility] has [facility]. We will need to double check if they approve inpatient placement of Nexplanon prior to insertion. Alternative of immediate postplacental LARC explained and offered, declined today.         Current Outpatient Medications   Medication Instructions    Prenatal Vit-Fe Fumarate-FA (PRENATAL VITAMIN PO) 1 tablet, Oral, Daily         G1 Problems (from 01/02/24 to present)       Problem Noted Resolved    ASCUS pap/+ other HRHPV 2/15/2024 by PHILIP Bhatti No    Overview Addendum 2/16/2024  8:12 AM by PHILIP Bhatti     Repeat in 1 year         Not immune to HBV 2/9/2024 by PHILIP Bhatti No    Overview Addendum 5/3/2024 10:27 AM by PHILIP Bhatti     Needs HBV vaccine booster                   Magnus Partida MD  OB/GYN  6/6/2024 4:59 PM

## 2024-06-07 LAB
CITATION REF LAB TEST: NORMAL
CLINICAL INFO: NORMAL
ETHNIC BACKGROUND STATED: NORMAL
FMR1 GENE CGG RPT BLD/T QL: NORMAL
GENE DIS ANL CARRIER INTERP-IMP: NORMAL
INDICATION: NORMAL
LAB DIRECTOR NAME PROVIDER: NORMAL
RECOMMENDATION PATIENT DOC-IMP: NORMAL
REF LAB TEST METHOD: NORMAL
SERVICE CMNT-IMP: NORMAL
SPECIMEN SOURCE: NORMAL

## 2024-06-21 ENCOUNTER — ROUTINE PRENATAL (OUTPATIENT)
Dept: OBGYN CLINIC | Facility: CLINIC | Age: 24
End: 2024-06-21

## 2024-06-21 VITALS
HEART RATE: 109 BPM | BODY MASS INDEX: 23.9 KG/M2 | HEIGHT: 64 IN | WEIGHT: 140 LBS | SYSTOLIC BLOOD PRESSURE: 103 MMHG | DIASTOLIC BLOOD PRESSURE: 52 MMHG

## 2024-06-21 DIAGNOSIS — Z3A.33 33 WEEKS GESTATION OF PREGNANCY: ICD-10-CM

## 2024-06-21 DIAGNOSIS — Z34.93 PRENATAL CARE IN THIRD TRIMESTER: Primary | ICD-10-CM

## 2024-06-21 PROCEDURE — PNV: Performed by: NURSE PRACTITIONER

## 2024-06-21 NOTE — PATIENT INSTRUCTIONS
[place] por jay confianza en nuestro equipo.   Le agradecemos y agradecemos bel comentarios.   Si recibe brandon encuesta nuestra, tómese unos momentos para informarnos cómo estamos.   Sinceramente,  GUMARO BhattiNP       El Tercer Trimestre  (28-42 semanas)   JAY BEBÉ   ·        jay bebé chupa jay dedo ahora!   ·        jay bebé puede oír voces y responder al tacto... habla con él o robyn!!   ·        el cerebro de jay bebé crece y se desarrolla más en los últimos 2 meses de embarazo   ·        john y huesos del bebé son suaves y flexibles para que quepan por el canal del parto   ·        los movimientos del bebé cambian hacia el final del embarazo porque hay menos espacio para patear y estiramientos en tu vientre   ·        los pulmones del bebé no están completamente desarrollados y totalmente preparados para respirar por bel propios hasta el último 3-4 semanas antes de jay fecha de vencimiento     TU CUERPO   ·        jay vientre está creciendo mucho ahora   ·        será más difícil dormir bianca de noche o ser tan activos jenise eres generalmente   ·        puede sudar más de lo habitual   ·        serás más desequilibrado... tenga cuidado de no caer!   ·        Usted puede desarrollar hemorroides (qué pueden ser dolorosas y hacen difícil sentarse)   ·        los dos últimos meses del embarazo puede ser muy incómodos con sabina de espalda, sabina de john y ardor de estómago   ·        Puedes empezar a tener contracciones... mientras son irregulares y menos de 5 por hora, esto es brandon parte normal de jay cuerpo a punto de tener un bebé   ·        el jl uterino puede empezar a dilatar y abrir arriba... para estar listo para el parto   ·        Usted puede encontrarse que necesitan hacer orinar muy a menudo... porque el bebé está presionando mucho la vejiga   ·        Usted puede quedarse corta de respiracion más rápido de lo masoud          CUENTAS DEL RETROCESO FETAL    En el tercer trimestre (después de 28  semanas de gestación) deben realizar patada fetal cuentas cada día. [place] bebé debe moverse al menos 10 veces en 2 horas uriah un tiempo activo, brandon vez al día.    Elegir el atime del día cuando el bebé es más activo. Trate de hacerlo a la misma hora cada día. Entrar en brandon posición cómoda y luego escribir el tiempo que tu bebé se mueve dany. Cuenta cada movimiento hasta que el bebé se mueve 10 veces. Estos movimientos incluyen patadas, puñetazos, codazos, revolotea o rollos. [place] puede tardar entre 5 minutos a 2 horas. Anote el tiempo que sientes movimiento 10 del bebé.    Si ha pasado 2 horas y tu bebé no se ha movido al menos 10 veces, usted debe llamar a la oficina de inmediato. [phone]          CAROL DE PARTO PREMATURO     Cuando llamar a [phone]:  * Tengo que llamar inmediatamente si tengo incluso brandon pequeña cantidad de LIQUIDO de mi vagina, con o sin contracciones.   * Tengo que llamar si estoy SANGRADO de mi vagina.   * Tengo que llamar si tengo COLICOS que continúa después de beber 2 ó 3 vasos de agua y acostados en mi lado uriah brandon hora y que se siente jenise que estoy teniendo un período.   * Tengo que llamar si siento CONTRACCIONES más de 4 veces en brandon hora quando siento que mi mitra se mantiene dura después de que trato de beber 2-3 vasos del agua y acostarme en mi lado uriah brandon hora.   * Tengo que llamar si michaela un cambio de mi FLUJO vaginal.   * Tengo que llamar si siento la [place] que siente que el bebé aprieta en mi vagina y dura más de brandon hora.   * Tengo que llamar si tengo DOLOR BAJO DE LA ESPALDA que es nueva y está cerca de mi cóccix. Puede entrar y salir varias veces uriah brandon hora o quedarse allí constantemente.         LA PRE-ECLAMPSIA    ¿Qué es? La preeclampsia es brandon enfermedad grave que puede ocurrir uriah el embarazo se relaciona con la presión arterial cathy. Le puede pasar a cualquier bhupinder.     ¿Por qué Yes importarme? Las mujeres Care preeclampsia  tienen riesgos graves pueden incluir convulsiones, trazo, daños en los órganos, nacimiento prematuro de jay bebé. En los peor de los casos, puede causar la muerte de la madre y jay bebé.     ¿Qué lisha pagar atención brandon? Signos y síntomas de la preeclampsia pueden incluir:   * Inflamación severa raul de la dalton o las   * Dolor de john todavía presente después de deya tomado Tylenol   * Kameron manchas o cambios en Lav[place]   * Dolor en la parte superior del abdomen o hombro   * Leslie náuseas y vómitos (en la segunda mitad del embarazo)   * Aumento de peso repentino   * Dificultad para respirar     ¿Qué lisha hacer? Si usted experimenta alguno de los síntomas de la preeclampsia, comuníquese con jay proveedor de OB. Encontrar la preeclampsia temprana es importante para usted y jay bebé. Llámanos al [phone].          LACTANCIA MATERNA     BENEFICIOS PARA LOS BEBÉS   ·       sistemas inmunológicos más amanda (menos alergias, eczema, asma y cáncer infantil)   ·       menos diarrea y estreñimiento u otras enfermedades GI   ·       menos resfriados e infecciones del oído   ·       mejor visión y dientes (menos cavidades)   ·       mejora el IQ   ·       menores tasas de diabetes y obesidad en la infancia     BENEFICIOS PARA LAS MADRES   ·        promueve la pérdida de peso más rápida después del parto   ·        lian riesgo para la depresión postparto   ·        lian riesgo para los cánceres de mama, útero y ovario   ·        lian riesgo para la osteoporosis en desarrollo con la edad   ·        siempre es más fácil que fórmula - correcto, limpio, y la temperatura adecuada   ·        menos costosa que la fórmula es gratis!!!     CLAVES PARA BRANDON LACTANCIA EXITOSA   ·        mantener bebé piel a piel hasta después de la primera alimentación evento   ·        tener a bebé en jay cuarto con usted uriah jay estadía en el hospital después del parto   ·        evitar cualquier botella de alimentación (a menos que sea  médicamente necesario)   ·        limitar el uso de chupones y pañales   ·        Pida ayuda si usted está teniendo problemas... consultores de lactancia (que se especializan en la lactancia) están disponibles para ayudarle a   ·        brandon dieta saludable para mamá... comiendo brandon variedad de alimentos y raciones con moderación     COSAS QUE DEBES SABER SOBRE LA LACTANCIA   ·        mayoría de los medicamentos se considera compatible con la lactancia materna por la [employer], christopher puedes consultarlo con jay médico o en lactancia antes de laila un medicamento nuevo... para estar seguro es seguro   ·        alcohol (cerveza, vino, licor) puede transmitirse de la madre al bebé a través de la leche materna... un ocasional, social bebida es considerado aceptable por la [employer]... más que eso deben evitarse   ·        la lactancia materna es NO es un método para evitar embarazo   ·        nicotina (cigarrillos) puede pasar de la madre al bebé a través de la leche materna... sin embargo, para las madres que fuman, es aún más saludable para amamantar que use la fórmula   ·        cafeína debería limitarse a 1-3 tazas por día... incluye café, refrescos, bebidas energéticas           MASAJE EN LA TERESITA PERINEAL O VAGINAL    Que puedo hacer ahora para reducir las probabilidades de desgarro uriah el parto?  Masaje alrededor del orificio de la vagina realizado por usted misma (o por jay alex).  El masaje en esta teresita, ya sea antes del parto o uriah la segunda etapa del parto, puede reducir la probabilidad de desgarro perineal uriah el parto.  Asimismo, el uso de compresas tibias en el perineo uriah la etapa expulsiva del parto puede reducir la pravedad del desparro.  [place] sucedera uriah la etapa expulsiva del parto.  En casa tambien puede reducir las probabilidades de sufrir lesiones durnate el parto de con masajes en la teresita perineal.    Quien?  Todas las mujeres  embarazadas a partir de, aproximadamente, las 34 semanas de embarazo.    Cuando?  Usted o jay alex deben hacer masajes en la bayron vaginal carito 5 a 10 minutos de 1 a 4 veces por semana.    [place]?  Use brandon mezcla de agua y aceite de almendras, earl u corley con 1 o 2 dedos (gary la comodidad).  Inserte los dedos en la vagina entre 3 y 5cm.  Aplique presion general hacia abajo y hacia los costados carito 5 a 10 minutos entre las 3 y las 9 horas, de 1 a 4 veces por semana.          SENALES CARITO EL EMBARAZO  Llame a nuestra oficina al [phone] para cualquiera de los siguientes:    1. Sangrado vaginal  2. Dolor abdominal valentina que no desaparece.  3. Fiebre (más de 100.4 y no se marcos con Tylenol)  4. Vómitos persistentes que dominguez más de 24 horas.  5. dolor de pecho  6. Dolor o ardor al orinar.  7. Dolor de john severo que no se resuelve con Tylenol  8. Visión borrosa o zack puntos en jay visión.  9. Hinchazón repentina de jay dalton o raul.  10. Enrojecimiento, hinchazón o dolor en brandon pierna.  11. Un aumento de peso repentino en pocos días.  12. Contar los movimientos fetales del bebé. (después de 28 semanas o el sexto mes de embarazo)  13. Brandon pérdida de líquido acuoso de la vagina: puede ser un chorro, un goteo o brandon humedad continua  14. Después de 20 semanas de embarazo, calambres rítmicos (más de 4 por hora) o menstruales edna dolor bajo / pélvico          VACUNAS CARITO EL EMBARAZO    TDAP  La tos ferina (o tos ferina) puede ser grave para cualquier persona, christopher para jay recién nacido, puede ser mortal. Hasta 20 recién nacidos mueren cada año en los [place] debido a la tos ferina. Aproximadamente la mitad de los bebés menores de 1 año de edad que contraen tos ferina necesitan tratamiento en el hospital. Cuanto más joven es el bebé cuando él o robyn son la tos ferina, más probable es que él o robyn tendrá que ser tratado en un hospital. Cuando usted recibe la vacuna contra la tos ferina (Tdap)  uriah jay embarazo, jay cuerpo creará anticuerpos protectores y algunos de ellos pasan a jay bebé antes de nacer. Estos anticuerpos pueden ayudar a proteger a jay bebé contraigan la tos ferina hasta que tienen edad suficiente para recibir la vacuna ellos mismos (generalmente alrededor de 6 meses de edad).      INFLUENZA  Cambios en las funciones inmunológica, del corazón y pulmón uriah el embarazo te hacen más susceptible a padecer seriamente enfermo de la gripe. Coger la gripe también aumenta las posibilidades de problemas graves para jay bebé en desarrollo, incluyendo la entrega y el trabajo de parto prematuro. Se recomienda que todas las mujeres que están embarazadas uriah la temporada de gripe deben recibir brandon vacuna contra la influenza.

## 2024-06-21 NOTE — PROGRESS NOTES
Lakesha Chong presents today for routine OB visit at 33w6d.  Blood Pressure: 103/52  Wt=63.5 kg (140 lb); Body mass index is 24.03 kg/m².; TWG=13.6 kg (30 lb)  Fetal Heart Rate: 138; Fundal Height (cm): 35 cm  Abdomen: gravid, soft, non-tender.  She reports no complaints.  Reports occasional mild uterine contractions.  Denies vaginal bleeding or leaking of fluid.  Reports adequate fetal movement of at least 10 movements in 2 hours once daily.  Scheduled for ultrasound 7/2/24.  Reviewed premature labor precautions and fetal kick counts.  Advised to continue medications and return in 2 weeks.      Current Outpatient Medications   Medication Instructions    Prenatal Vit-Fe Fumarate-FA (PRENATAL VITAMIN PO) 1 tablet, Oral, Daily       Laboratory workup: initial OB labs (done 1/10/24); 28 week labs (done 5/30/24)    Genetic Screening: NIPS negative, MSAFP negative    Vaccinations: influenza (declined 1/16/24); Tdap (given 5/17/24); RSV (not indicated outside RSV season); COVID-19 (rec'd 4/29/21 & 5/10/21 & 2/1/22)    Postpartum contraception: desires Nexplanon    Fetal Ultrasounds:  1/2/24 (9w0d) EDC confirmed  2/1/24 (13w5d) NT WNL  3/26/24 (21w3d) no previa, fermin WNL & complete      G1 Problems (from 01/02/24 to present)       Problem Noted Resolved    ASCUS pap/+ other HRHPV 2/15/2024 by PHILIP Bhatti No    Overview Addendum 2/16/2024  8:12 AM by PHILIP Bhatti     Repeat in 1 year         Not immune to HBV 2/9/2024 by PHILIP Bhatti No    Overview Addendum 5/3/2024 10:27 AM by PHILIP Bhatti     Needs HBV vaccine booster

## 2024-07-01 PROBLEM — Z3A.35 35 WEEKS GESTATION OF PREGNANCY: Status: ACTIVE | Noted: 2024-01-02

## 2024-07-02 ENCOUNTER — ULTRASOUND (OUTPATIENT)
Age: 24
End: 2024-07-02
Payer: COMMERCIAL

## 2024-07-02 VITALS
BODY MASS INDEX: 24.21 KG/M2 | DIASTOLIC BLOOD PRESSURE: 56 MMHG | HEART RATE: 85 BPM | WEIGHT: 141.8 LBS | SYSTOLIC BLOOD PRESSURE: 106 MMHG | HEIGHT: 64 IN

## 2024-07-02 DIAGNOSIS — Z36.89 ENCOUNTER FOR ULTRASOUND TO CHECK FETAL GROWTH: ICD-10-CM

## 2024-07-02 DIAGNOSIS — Z3A.35 35 WEEKS GESTATION OF PREGNANCY: Primary | ICD-10-CM

## 2024-07-02 PROCEDURE — 76816 OB US FOLLOW-UP PER FETUS: CPT | Performed by: OBSTETRICS & GYNECOLOGY

## 2024-07-02 PROCEDURE — 99212 OFFICE O/P EST SF 10 MIN: CPT | Performed by: OBSTETRICS & GYNECOLOGY

## 2024-07-02 NOTE — PROGRESS NOTES
"[facility]: Lakesha Chong was seen today for fetal growth assessment ultrasound.  See ultrasound report under \"OB Procedures\" tab.   The time spent on this established patient on the encounter date included 5 minutes previsit service time reviewing records and precharting, 5 minutes face-to-face service time counseling regarding results and coordinating care and use of  ID#[id number], and  5 minutes charting, totalling 15 minutes.  Please don't hesitate to contact our office with any concerns or questions.  -Jaquelin Cage MD    "

## 2024-07-08 PROBLEM — Z3A.36 36 WEEKS GESTATION OF PREGNANCY: Status: ACTIVE | Noted: 2024-01-02

## 2024-07-08 NOTE — PROGRESS NOTES
OB/GYN Prenatal Visit    SUBJECTIVE:  Lakesha Chong is a 24 y.o.  at 36w2d here for prenatal visit.  She has no obstetric complaints and denies pelvic pain, cramping/contractions, vaginal bleeding, loss of fluid, or decreased fetal movement.     I consented the patient for delivery. The patient was counseled about the labor process. We discussed three routes of delivery including spontaneous vaginal delivery, operative vaginal delivery and  delivery. The patient was counseled on the risks of vaginal delivery including pain, vaginal/perineal tears and the need for repair at the bedside, infection and bleeding.      Patient counseled on indications necessitating operative vaginal delivery including: maternal medical indications in which patient would need to avoid pushing, prolonged second stage and nonreassuring fetal heart tracing during second stage. Patient counseled on maternal risks of vaginal delivery including increase risk of tearing and hemorrhage. We also discussed fetal risks including intracranial hemorrhage, lacerations, nerve damage or fracture. Patient is aware that NICU providers would be available at the time of delivery to assess fetal status after delivery and need for resuscitation.      She was counseled on the indications for  section including: failed induction, arrest of dilation, persistent category II tracing and arrest of descent. She was counseled on the indications for emergent  section including evidence of worsening fetal or maternal status, and that there is a risk of general anesthesia. We discussed the risks of  including bleeding, infection, and injury to surrounding structures including the bowel and bladder.     She was counseled that there are medical and surgical methods to manage excessive postpartum bleeding. She was counseled that in the event of excessive blood loss, she may require blood transfusion which includes a  small risk of blood borne diseases such as hepatitis and HIV. The patient is OK with receiving a blood transfusion if necessary.  The patient had an opportunity to ask questions and signed consent.       OBJECTIVE:  Vitals:    24 0819   BP: 109/67   Pulse: 73       FHT: 125 bpm  Fundal height: 36 cm  SVE: declined by pt    GEN: The patient was alert and oriented x3, pleasant well-appearing female in no acute distress.   CV: Regular rate  PULM: nonlabored respirations  MSK: Normal gait  Skin: warm, dry  Neuro: no focal deficits  Psych: normal affect and judgement, cooperative     ASSESSMENT / PLAN:    Problem List       36 weeks gestation of pregnancy    Overview     Overview:  Labs  Pap smear 2024, Ascus, HPV other +; GC/CT WNL  Prenatal panel WNL  28w labs WNL  GBS collected 24  Genetic screening NIPT WNL, SMA/CF neg, MSAFP WNL  Contraception: nexplanon  Feeding plan: breast/bottle  Birth plan:  with epidural  Delivery consent: signed 24  Ultrasounds: WNL, growth  in 73th%    Assessment and Plan:  No obstetric complaints  Discussed  labor precautions and fetal kick counts  Return to clinic in 1 weeks          Not immune to HBV    Overview     Needs HBV vaccine booster         ASCUS pap/+ other HRHPV    Overview     Repeat in 1 year                Elodia De La Cruz DO  2024  8:49 AM

## 2024-07-09 ENCOUNTER — ROUTINE PRENATAL (OUTPATIENT)
Dept: OBGYN CLINIC | Facility: CLINIC | Age: 24
End: 2024-07-09

## 2024-07-09 VITALS
BODY MASS INDEX: 23.9 KG/M2 | SYSTOLIC BLOOD PRESSURE: 109 MMHG | WEIGHT: 140 LBS | HEART RATE: 73 BPM | HEIGHT: 64 IN | DIASTOLIC BLOOD PRESSURE: 67 MMHG

## 2024-07-09 DIAGNOSIS — Z3A.36 36 WEEKS GESTATION OF PREGNANCY: Primary | ICD-10-CM

## 2024-07-09 PROCEDURE — PNV: Performed by: OBSTETRICS & GYNECOLOGY

## 2024-07-09 PROCEDURE — 87150 DNA/RNA AMPLIFIED PROBE: CPT

## 2024-07-09 NOTE — PROGRESS NOTES
OB/GYN Prenatal Visit    ASSESSMENT / PLAN:  1. 37 weeks gestation of pregnancy  Assessment & Plan:  Patient doing well with no OB complaints  Discussed labor precautions and fetal kick counts  Return to clinic in 1 week  2. Prenatal care in third trimester        SUBJECTIVE:  Lakesha Chong is a 24 y.o.  at 37w3d here for prenatal visit.  She has no obstetric complaints and denies pelvic pain, cramping/contractions, vaginal bleeding, loss of fluid, or decreased fetal movement.     : [id number] (Memo Chawla)    OBJECTIVE:  Vitals:    24 0915   BP: 110/73   Pulse: 85       FHT: 140 bpm  Fundal height: 37 cm  SVE: declines    Physical Exam:    General: Well appearing, no distress  Respiratory: Unlabored breathing  Cardiovascular: Regular rate.  Abdomen: Soft, gravid, nontender  Fundal Height: Appropriate for gestational age.  Extremities: Warm and well perfused.  Non tender.      Valery Brennan MD  2024  9:25 AM

## 2024-07-09 NOTE — ASSESSMENT & PLAN NOTE
Patient doing well with no OB complaints  Discussed labor precautions and fetal kick counts  Return to clinic in 1 week

## 2024-07-11 LAB — GP B STREP DNA SPEC QL NAA+PROBE: NEGATIVE

## 2024-07-15 PROBLEM — Z3A.37 37 WEEKS GESTATION OF PREGNANCY: Status: ACTIVE | Noted: 2024-01-02

## 2024-07-16 ENCOUNTER — ROUTINE PRENATAL (OUTPATIENT)
Dept: OBGYN CLINIC | Facility: CLINIC | Age: 24
End: 2024-07-16

## 2024-07-16 VITALS
SYSTOLIC BLOOD PRESSURE: 110 MMHG | WEIGHT: 142.6 LBS | BODY MASS INDEX: 24.34 KG/M2 | DIASTOLIC BLOOD PRESSURE: 73 MMHG | HEIGHT: 64 IN | HEART RATE: 85 BPM

## 2024-07-16 DIAGNOSIS — Z3A.37 37 WEEKS GESTATION OF PREGNANCY: Primary | ICD-10-CM

## 2024-07-16 DIAGNOSIS — Z34.93 PRENATAL CARE IN THIRD TRIMESTER: ICD-10-CM

## 2024-07-16 PROCEDURE — PNV: Performed by: OBSTETRICS & GYNECOLOGY

## 2024-07-22 NOTE — PROGRESS NOTES
OB/GYN Prenatal Visit    SUBJECTIVE:  Lakesha Chong is a 24 y.o.  at 38w2d here for prenatal visit.  She has no obstetric complaints and denies pelvic pain, cramping/contractions, vaginal bleeding, loss of fluid, or decreased fetal movement. She has occasional contractions that are not regular. She declines IOL until she is 40 weeks, open to discussing next week    OBJECTIVE:  Vitals:    24 1037   BP: 103/67   Pulse: 57     FHT: 125 bpm  Fundal height: 39 cm  SVE: /-3    GEN: The patient was alert and oriented x3, pleasant well-appearing female in no acute distress.   CV: Regular rate  PULM: nonlabored respirations  MSK: Normal gait  Skin: warm, dry  Neuro: no focal deficits  Psych: normal affect and judgement, cooperative    U/S: Baby confirmed to be head down    ASSESSMENT / PLAN:    Problem List       37 weeks gestation of pregnancy    Overview     Overview:  Labs  Pap smear 2024, Ascus, HPV other +; GC/CT WNL  Prenatal panel WNL  28w labs WNL  GBS collected 24  Genetic screening NIPT WNL, SMA/CF neg, MSAFP WNL  Contraception: nexplanon  Feeding plan: breast/bottle  Birth plan:  with epidural  Delivery consent: signed 24  Ultrasounds: WNL, growth  in 73th%    Assessment and Plan:  No obstetric complaints  Discussed  labor precautions and fetal kick counts  Return to clinic in 1 weeks          Not immune to HBV    Overview     Needs HBV vaccine booster         ASCUS pap/+ other HRHPV    Overview     Repeat in 1 year          Discussed with Dr. Joann De La Cruz DO  2024  11:17 AM

## 2024-07-23 ENCOUNTER — ROUTINE PRENATAL (OUTPATIENT)
Dept: OBGYN CLINIC | Facility: CLINIC | Age: 24
End: 2024-07-23

## 2024-07-23 VITALS
WEIGHT: 143.2 LBS | HEART RATE: 57 BPM | HEIGHT: 64 IN | SYSTOLIC BLOOD PRESSURE: 103 MMHG | DIASTOLIC BLOOD PRESSURE: 67 MMHG | BODY MASS INDEX: 24.45 KG/M2

## 2024-07-23 DIAGNOSIS — Z3A.38 38 WEEKS GESTATION OF PREGNANCY: Primary | ICD-10-CM

## 2024-07-23 PROCEDURE — PNV: Performed by: OBSTETRICS & GYNECOLOGY

## 2024-08-02 ENCOUNTER — ROUTINE PRENATAL (OUTPATIENT)
Dept: OBGYN CLINIC | Facility: CLINIC | Age: 24
End: 2024-08-02

## 2024-08-02 VITALS
HEIGHT: 64 IN | BODY MASS INDEX: 25.06 KG/M2 | DIASTOLIC BLOOD PRESSURE: 76 MMHG | SYSTOLIC BLOOD PRESSURE: 112 MMHG | HEART RATE: 88 BPM | WEIGHT: 146.8 LBS

## 2024-08-02 DIAGNOSIS — Z3A.39 39 WEEKS GESTATION OF PREGNANCY: ICD-10-CM

## 2024-08-02 DIAGNOSIS — Z34.93 PRENATAL CARE IN THIRD TRIMESTER: Primary | ICD-10-CM

## 2024-08-02 PROCEDURE — PNV: Performed by: NURSE PRACTITIONER

## 2024-08-02 NOTE — PROGRESS NOTES
Lakesha Chong presents today for routine OB visit at 39w6d.  Blood Pressure: 112/76  Wt=66.6 kg (146 lb 12.8 oz); Body mass index is 25.2 kg/m².; TWG=16.7 kg (36 lb 12.8 oz)  Fetal Heart Rate: 138; Fundal Height (cm): 40 cm  SVE: 1cm/60%/-3  Abdomen: gravid, soft, non-tender.  She reports pelvic pressure.  Denies uterine contractions.  Denies vaginal bleeding or leaking of fluid.  Reports adequate fetal movement of at least 10 movements in 2 hours once daily.  Reviewed labor precautions and fetal kick counts as well as pre-eclampsia warning signs.  Reviewed perineal massage for decreasing risk of perineal lacerations during delivery.  Advised to continue medications and she has been scheduled for induction of labor on 8/4/2024 @2100.      Current Outpatient Medications   Medication Instructions    Prenatal Vit-Fe Fumarate-FA (PRENATAL VITAMIN PO) 1 tablet, Oral, Daily       Laboratory workup: initial OB labs (done 1/10/24); 28 week labs (done 5/30/24); 36 week cultures (done 7/9/24)    Genetic Screening: NIPS negative, MSAFP negative    Vaccinations: influenza (declined 1/16/24); Tdap (given 5/17/24); RSV (not indicated outside RSV season); COVID-19 (rec'd 4/29/21 & 5/10/21 & 2/1/22)    Postpartum contraception: desires Nexplanon    Fetal Ultrasounds:  1/2/24 (9w0d) EDC confirmed  2/1/24 (13w5d) NT WNL  3/26/24 (21w3d) no previa, fermin WNL & complete  7/2/24 (35w3d) EFW=73%, AC=98%, MATT=17.3cm, vertex      G1 Problems (from 01/02/24 to present)       Problem Noted Resolved    ASCUS pap/+ other HRHPV 2/15/2024 by PHILIP Bhatti No    Overview Addendum 2/16/2024  8:12 AM by PHILIP Bhatti     Repeat in 1 year         Not immune to HBV 2/9/2024 by PHILIP Bhatti No    Overview Addendum 5/3/2024 10:27 AM by PHILIP Bhatti     Needs HBV vaccine booster

## 2024-08-02 NOTE — PATIENT INSTRUCTIONS
[place] por jay confianza en nuestro equipo.   Le agradecemos y agradecemos bel comentarios.   Si recibe brandon encuesta nuestra, tómese unos momentos para informarnos cómo estamos.   Sinceramente,  PHILIP Bhatti       CAROL DE PARTO   Llame a nuestra oficina al [phone] para cualquiera de los siguientes:    * Necesito llamar inmediatamente yo si tengo incluso brandon pequeña cantidad de LIQUIDO se escapa de mi vagina, con o sin contracciones.   * Antonella llamar si tengo SANGRADO brandon cantidad igual o más que un período. Brandon pequeña cantidad de flujo vaginal sangriento es normal al final del embarazo.   * Antonella llamar si tengo CONTRACCIONES cada dewayne minutos uriah al menos brandon hora. Necesito un reloj para tiempo. Yo antonella tiempo desde el comienzo de brandon contracción hasta el comienzo de la siguiente.   * De ser necesario ANTES DE  ir al hospital.   * Necesito tener un plan para TRANSPORTE a ir al hospital cuando estoy en trabajo de parto. Trabajo generalmente no es brandon emergencia que requiere brandon ambulancia.          CUENTAS DEL RETROCESO FETAL    En el tercer trimestre (después de 28 semanas de gestación) deben realizar patada fetal cuentas cada día. Jay bebé debe moverse al menos 10 veces en 2 horas uriah un tiempo activo, brandon vez al día.    Elegir el atime del día cuando el bebé es más activo. Trate de hacerlo a la misma hora cada día. Entrar en brandon posición cómoda y luego escribir el tiempo que tu bebé se mueve dany. Cuenta cada movimiento hasta que el bebé se mueve 10 veces. Estos movimientos incluyen patadas, puñetazos, codazos, revolotea o rollos. [place] puede tardar entre 5 minutos a 2 horas. Anote el tiempo que sientes movimiento 10 del bebé.    Si ha pasado 2 horas y tu bebé no se ha movido al menos 10 veces, usted debe llamar a la oficina de inmediato. [phone]          MASAJE EN LA TERESITA PERINEAL O VAGINAL    Que puedo hacer ahora para reducir las probabilidades de desgarro uriah el  parto?  Masaje alrededor del orificio de la vagina realizado por usted misma (o por jay alex).  El masaje en esta bayron, ya sea antes del parto o uriah la segunda etapa del parto, puede reducir la probabilidad de desgarro perineal uriah el parto.  Asimismo, el uso de compresas tibias en el perineo uriah la etapa expulsiva del parto puede reducir la pravedad del desparro.  [place] sucedera uriah la etapa expulsiva del parto.  En casa tambien puede reducir las probabilidades de sufrir lesiones durnate el parto de con masajes en la bayron perineal.    Quien?  Todas las mujeres embarazadas a partir de, aproximadamente, las 34 semanas de embarazo.    Cuando?  Usted o jay alex deben hacer masajes en la bayron vaginal uriah 5 a 10 minutos de 1 a 4 veces por semana.    [place]?  Use brandon mezcla de agua y aceite de almendras, earl u corley con 1 o 2 dedos (gary la comodidad).  Inserte los dedos en la vagina entre 3 y 5cm.  Aplique presion general hacia abajo y hacia los costados uriah 5 a 10 minutos entre las 3 y las 9 horas, de 1 a 4 veces por semana.

## 2024-08-04 ENCOUNTER — APPOINTMENT (INPATIENT)
Dept: RADIOLOGY | Facility: HOSPITAL | Age: 24
End: 2024-08-04
Payer: COMMERCIAL

## 2024-08-04 ENCOUNTER — ANESTHESIA EVENT (INPATIENT)
Dept: LABOR AND DELIVERY | Facility: HOSPITAL | Age: 24
End: 2024-08-04
Payer: COMMERCIAL

## 2024-08-04 ENCOUNTER — ANESTHESIA (INPATIENT)
Dept: LABOR AND DELIVERY | Facility: HOSPITAL | Age: 24
End: 2024-08-04
Payer: COMMERCIAL

## 2024-08-04 ENCOUNTER — HOSPITAL ENCOUNTER (OUTPATIENT)
Dept: LABOR AND DELIVERY | Facility: HOSPITAL | Age: 24
Discharge: HOME | End: 2024-08-04
Payer: COMMERCIAL

## 2024-08-04 ENCOUNTER — HOSPITAL ENCOUNTER (INPATIENT)
Facility: HOSPITAL | Age: 24
LOS: 3 days | Discharge: HOME | End: 2024-08-07
Attending: OBSTETRICS & GYNECOLOGY | Admitting: OBSTETRICS & GYNECOLOGY
Payer: COMMERCIAL

## 2024-08-04 DIAGNOSIS — Z3A.39 39 WEEKS GESTATION OF PREGNANCY: ICD-10-CM

## 2024-08-04 DIAGNOSIS — Z98.891 S/P CESAREAN SECTION: Primary | ICD-10-CM

## 2024-08-04 DIAGNOSIS — Z34.90 ENCOUNTER FOR INDUCTION OF LABOR: ICD-10-CM

## 2024-08-04 LAB
ABO GROUP BLD: NORMAL
BASE EXCESS BLDCOA CALC-SCNC: -3.5 MMOL/L (ref 3–11)
BASE EXCESS BLDCOV CALC-SCNC: -4.1 MMOL/L (ref 1–9)
BLD GP AB SCN SERPL QL: NEGATIVE
ERYTHROCYTE [DISTWIDTH] IN BLOOD BY AUTOMATED COUNT: 14.4 % (ref 11.6–15.1)
HCO3 BLDCOA-SCNC: 26.1 MMOL/L (ref 17.3–27.3)
HCO3 BLDCOV-SCNC: 25.1 MMOL/L (ref 12.2–28.6)
HCT VFR BLD AUTO: 37.8 % (ref 34.8–46.1)
HGB BLD-MCNC: 12.2 G/DL (ref 11.5–15.4)
HOLD SPECIMEN: YES
MCH RBC QN AUTO: 26.9 PG (ref 26.8–34.3)
MCHC RBC AUTO-ENTMCNC: 32.3 G/DL (ref 31.4–37.4)
MCV RBC AUTO: 83 FL (ref 82–98)
O2 CT VFR BLDCOA CALC: 6.4 ML/DL
OXYHGB MFR BLDCOA: 25 %
OXYHGB MFR BLDCOV: 30.2 %
PCO2 BLDCOA: 64.4 MM[HG] (ref 30–60)
PCO2 BLDCOV: 61.5 MM HG (ref 27–43)
PH BLDCOA: 7.22 [PH] (ref 7.23–7.43)
PH BLDCOV: 7.23 [PH] (ref 7.19–7.49)
PLATELET # BLD AUTO: 328 THOUSANDS/UL (ref 149–390)
PMV BLD AUTO: 10.6 FL (ref 8.9–12.7)
PO2 BLDCOA: 15.1 MM HG (ref 5–25)
PO2 BLDCOV: 16.8 MM HG (ref 15–45)
RBC # BLD AUTO: 4.54 MILLION/UL (ref 3.81–5.12)
RH BLD: POSITIVE
SAO2 % BLDCOV: 7.6 ML/DL
SPECIMEN EXPIRATION DATE: NORMAL
WBC # BLD AUTO: 10.44 THOUSAND/UL (ref 4.31–10.16)

## 2024-08-04 PROCEDURE — NC001 PR NO CHARGE: Performed by: OBSTETRICS & GYNECOLOGY

## 2024-08-04 PROCEDURE — 86780 TREPONEMA PALLIDUM: CPT

## 2024-08-04 PROCEDURE — 86901 BLOOD TYPING SEROLOGIC RH(D): CPT

## 2024-08-04 PROCEDURE — 86850 RBC ANTIBODY SCREEN: CPT

## 2024-08-04 PROCEDURE — 86900 BLOOD TYPING SEROLOGIC ABO: CPT

## 2024-08-04 PROCEDURE — 85027 COMPLETE CBC AUTOMATED: CPT

## 2024-08-04 PROCEDURE — 59514 CESAREAN DELIVERY ONLY: CPT | Performed by: OBSTETRICS & GYNECOLOGY

## 2024-08-04 PROCEDURE — 74018 RADEX ABDOMEN 1 VIEW: CPT

## 2024-08-04 PROCEDURE — 88307 TISSUE EXAM BY PATHOLOGIST: CPT | Performed by: PATHOLOGY

## 2024-08-04 PROCEDURE — 82805 BLOOD GASES W/O2 SATURATION: CPT | Performed by: OBSTETRICS & GYNECOLOGY

## 2024-08-04 PROCEDURE — NC001 PR NO CHARGE

## 2024-08-04 RX ORDER — SODIUM CHLORIDE, SODIUM LACTATE, POTASSIUM CHLORIDE, CALCIUM CHLORIDE 600; 310; 30; 20 MG/100ML; MG/100ML; MG/100ML; MG/100ML
125 INJECTION, SOLUTION INTRAVENOUS CONTINUOUS
Status: DISCONTINUED | OUTPATIENT
Start: 2024-08-04 | End: 2024-08-06

## 2024-08-04 RX ORDER — PROPOFOL 10 MG/ML
INJECTION, EMULSION INTRAVENOUS
Status: DISPENSED
Start: 2024-08-04 | End: 2024-08-05

## 2024-08-04 RX ORDER — HYDROMORPHONE HCL/PF 1 MG/ML
SYRINGE (ML) INJECTION
Status: COMPLETED
Start: 2024-08-04 | End: 2024-08-04

## 2024-08-04 RX ORDER — BUPIVACAINE HYDROCHLORIDE 2.5 MG/ML
30 INJECTION, SOLUTION EPIDURAL; INFILTRATION; INTRACAUDAL ONCE AS NEEDED
Status: DISCONTINUED | OUTPATIENT
Start: 2024-08-04 | End: 2024-08-04

## 2024-08-04 RX ORDER — SENNOSIDES 8.6 MG
1 TABLET ORAL DAILY
Status: DISCONTINUED | OUTPATIENT
Start: 2024-08-05 | End: 2024-08-07 | Stop reason: HOSPADM

## 2024-08-04 RX ORDER — ONDANSETRON 2 MG/ML
INJECTION INTRAMUSCULAR; INTRAVENOUS AS NEEDED
Status: DISCONTINUED | OUTPATIENT
Start: 2024-08-04 | End: 2024-08-04

## 2024-08-04 RX ORDER — FENTANYL CITRATE/PF 50 MCG/ML
25 SYRINGE (ML) INJECTION
Status: CANCELLED | OUTPATIENT
Start: 2024-08-04

## 2024-08-04 RX ORDER — ACETAMINOPHEN 10 MG/ML
INJECTION, SOLUTION INTRAVENOUS
Status: DISPENSED
Start: 2024-08-04 | End: 2024-08-05

## 2024-08-04 RX ORDER — CEPHALEXIN 500 MG/1
500 CAPSULE ORAL EVERY 8 HOURS SCHEDULED
Status: COMPLETED | OUTPATIENT
Start: 2024-08-04 | End: 2024-08-06

## 2024-08-04 RX ORDER — DEXAMETHASONE SODIUM PHOSPHATE 10 MG/ML
INJECTION, SOLUTION INTRAMUSCULAR; INTRAVENOUS AS NEEDED
Status: DISCONTINUED | OUTPATIENT
Start: 2024-08-04 | End: 2024-08-04

## 2024-08-04 RX ORDER — SUCCINYLCHOLINE/SOD CL,ISO/PF 100 MG/5ML
SYRINGE (ML) INTRAVENOUS
Status: DISPENSED
Start: 2024-08-04 | End: 2024-08-05

## 2024-08-04 RX ORDER — LIDOCAINE HCL/EPINEPHRINE/PF 2%-1:200K
VIAL (ML) INJECTION
Status: DISPENSED
Start: 2024-08-04 | End: 2024-08-05

## 2024-08-04 RX ORDER — HYDROMORPHONE HCL/PF 1 MG/ML
SYRINGE (ML) INJECTION AS NEEDED
Status: DISCONTINUED | OUTPATIENT
Start: 2024-08-04 | End: 2024-08-04

## 2024-08-04 RX ORDER — SIMETHICONE 80 MG
80 TABLET,CHEWABLE ORAL 4 TIMES DAILY PRN
Status: DISCONTINUED | OUTPATIENT
Start: 2024-08-04 | End: 2024-08-07 | Stop reason: HOSPADM

## 2024-08-04 RX ORDER — PROPOFOL 10 MG/ML
INJECTION, EMULSION INTRAVENOUS AS NEEDED
Status: DISCONTINUED | OUTPATIENT
Start: 2024-08-04 | End: 2024-08-04

## 2024-08-04 RX ORDER — HYDROMORPHONE HCL/PF 1 MG/ML
0.5 SYRINGE (ML) INJECTION
Status: CANCELLED | OUTPATIENT
Start: 2024-08-04

## 2024-08-04 RX ORDER — FENTANYL CITRATE 50 UG/ML
INJECTION, SOLUTION INTRAMUSCULAR; INTRAVENOUS AS NEEDED
Status: DISCONTINUED | OUTPATIENT
Start: 2024-08-04 | End: 2024-08-04

## 2024-08-04 RX ORDER — OXYCODONE HYDROCHLORIDE 5 MG/1
5 TABLET ORAL EVERY 4 HOURS PRN
Status: DISCONTINUED | OUTPATIENT
Start: 2024-08-05 | End: 2024-08-07 | Stop reason: HOSPADM

## 2024-08-04 RX ORDER — CEFAZOLIN SODIUM 1 G/3ML
INJECTION, POWDER, FOR SOLUTION INTRAMUSCULAR; INTRAVENOUS AS NEEDED
Status: DISCONTINUED | OUTPATIENT
Start: 2024-08-04 | End: 2024-08-04

## 2024-08-04 RX ORDER — FENTANYL CITRATE 50 UG/ML
INJECTION, SOLUTION INTRAMUSCULAR; INTRAVENOUS
Status: DISPENSED
Start: 2024-08-04 | End: 2024-08-05

## 2024-08-04 RX ORDER — OXYTOCIN/RINGER'S LACTATE 30/500 ML
62.5 PLASTIC BAG, INJECTION (ML) INTRAVENOUS ONCE
Status: COMPLETED | OUTPATIENT
Start: 2024-08-04 | End: 2024-08-05

## 2024-08-04 RX ORDER — OXYTOCIN/RINGER'S LACTATE 30/500 ML
PLASTIC BAG, INJECTION (ML) INTRAVENOUS AS NEEDED
Status: DISCONTINUED | OUTPATIENT
Start: 2024-08-04 | End: 2024-08-04

## 2024-08-04 RX ORDER — METRONIDAZOLE 500 MG/1
500 TABLET ORAL EVERY 8 HOURS SCHEDULED
Status: COMPLETED | OUTPATIENT
Start: 2024-08-04 | End: 2024-08-06

## 2024-08-04 RX ORDER — ACETAMINOPHEN 325 MG/1
650 TABLET ORAL EVERY 6 HOURS SCHEDULED
Status: DISCONTINUED | OUTPATIENT
Start: 2024-08-05 | End: 2024-08-07 | Stop reason: HOSPADM

## 2024-08-04 RX ORDER — TERBUTALINE SULFATE 1 MG/ML
INJECTION, SOLUTION SUBCUTANEOUS
Status: DISPENSED
Start: 2024-08-04 | End: 2024-08-05

## 2024-08-04 RX ORDER — CALCIUM CARBONATE 500 MG/1
1000 TABLET, CHEWABLE ORAL DAILY PRN
Status: DISCONTINUED | OUTPATIENT
Start: 2024-08-04 | End: 2024-08-07 | Stop reason: HOSPADM

## 2024-08-04 RX ORDER — OXYCODONE HYDROCHLORIDE 5 MG/1
10 TABLET ORAL EVERY 4 HOURS PRN
Status: DISCONTINUED | OUTPATIENT
Start: 2024-08-05 | End: 2024-08-07 | Stop reason: HOSPADM

## 2024-08-04 RX ORDER — ONDANSETRON 2 MG/ML
4 INJECTION INTRAMUSCULAR; INTRAVENOUS EVERY 8 HOURS PRN
Status: DISCONTINUED | OUTPATIENT
Start: 2024-08-04 | End: 2024-08-07 | Stop reason: HOSPADM

## 2024-08-04 RX ORDER — DIPHENHYDRAMINE HYDROCHLORIDE 50 MG/ML
25 INJECTION INTRAMUSCULAR; INTRAVENOUS EVERY 6 HOURS PRN
Status: DISCONTINUED | OUTPATIENT
Start: 2024-08-04 | End: 2024-08-07 | Stop reason: HOSPADM

## 2024-08-04 RX ORDER — SUCCINYLCHOLINE/SOD CL,ISO/PF 100 MG/5ML
SYRINGE (ML) INTRAVENOUS AS NEEDED
Status: DISCONTINUED | OUTPATIENT
Start: 2024-08-04 | End: 2024-08-04

## 2024-08-04 RX ORDER — KETOROLAC TROMETHAMINE 30 MG/ML
INJECTION, SOLUTION INTRAMUSCULAR; INTRAVENOUS
Status: COMPLETED
Start: 2024-08-04 | End: 2024-08-04

## 2024-08-04 RX ORDER — KETOROLAC TROMETHAMINE 30 MG/ML
INJECTION, SOLUTION INTRAMUSCULAR; INTRAVENOUS AS NEEDED
Status: DISCONTINUED | OUTPATIENT
Start: 2024-08-04 | End: 2024-08-04

## 2024-08-04 RX ADMIN — HYDROMORPHONE HYDROCHLORIDE 0.5 MG: 1 INJECTION, SOLUTION INTRAMUSCULAR; INTRAVENOUS; SUBCUTANEOUS at 22:37

## 2024-08-04 RX ADMIN — ONDANSETRON 4 MG: 2 INJECTION INTRAMUSCULAR; INTRAVENOUS at 22:29

## 2024-08-04 RX ADMIN — PROPOFOL 50 MG: 10 INJECTION, EMULSION INTRAVENOUS at 22:37

## 2024-08-04 RX ADMIN — NOREPINEPHRINE BITARTRATE 10 MCG: 1 INJECTION, SOLUTION, CONCENTRATE INTRAVENOUS at 22:35

## 2024-08-04 RX ADMIN — Medication 25 MCG: at 22:05

## 2024-08-04 RX ADMIN — PROPOFOL 200 MG: 10 INJECTION, EMULSION INTRAVENOUS at 22:22

## 2024-08-04 RX ADMIN — Medication 62.5 MILLI-UNITS/MIN: at 23:15

## 2024-08-04 RX ADMIN — NOREPINEPHRINE BITARTRATE 10 MCG: 1 INJECTION, SOLUTION, CONCENTRATE INTRAVENOUS at 22:32

## 2024-08-04 RX ADMIN — KETOROLAC TROMETHAMINE 30 MG: 30 INJECTION, SOLUTION INTRAMUSCULAR; INTRAVENOUS at 22:39

## 2024-08-04 RX ADMIN — SODIUM CHLORIDE, SODIUM LACTATE, POTASSIUM CHLORIDE, AND CALCIUM CHLORIDE 125 ML/HR: .6; .31; .03; .02 INJECTION, SOLUTION INTRAVENOUS at 23:48

## 2024-08-04 RX ADMIN — Medication 999 MILLI-UNITS/MIN: at 22:24

## 2024-08-04 RX ADMIN — Medication 999 MILLI-UNITS/MIN: at 22:46

## 2024-08-04 RX ADMIN — DEXAMETHASONE SODIUM PHOSPHATE 10 MG: 10 INJECTION INTRAMUSCULAR; INTRAVENOUS at 22:29

## 2024-08-04 RX ADMIN — CEFAZOLIN 2000 MG: 1 INJECTION, POWDER, FOR SOLUTION INTRAMUSCULAR; INTRAVENOUS; PARENTERAL at 22:24

## 2024-08-04 RX ADMIN — HYDROMORPHONE HYDROCHLORIDE 0.5 MG: 1 INJECTION, SOLUTION INTRAMUSCULAR; INTRAVENOUS; SUBCUTANEOUS at 22:26

## 2024-08-04 RX ADMIN — SODIUM CHLORIDE, SODIUM LACTATE, POTASSIUM CHLORIDE, AND CALCIUM CHLORIDE: .6; .31; .03; .02 INJECTION, SOLUTION INTRAVENOUS at 22:55

## 2024-08-04 RX ADMIN — NOREPINEPHRINE BITARTRATE 15 MCG: 1 INJECTION, SOLUTION, CONCENTRATE INTRAVENOUS at 22:41

## 2024-08-04 RX ADMIN — Medication 100 MG: at 22:22

## 2024-08-04 RX ADMIN — PROPOFOL 50 MG: 10 INJECTION, EMULSION INTRAVENOUS at 22:54

## 2024-08-04 RX ADMIN — NOREPINEPHRINE BITARTRATE 15 MCG: 1 INJECTION, SOLUTION, CONCENTRATE INTRAVENOUS at 22:46

## 2024-08-04 RX ADMIN — FENTANYL CITRATE 100 MCG: 50 INJECTION INTRAMUSCULAR; INTRAVENOUS at 22:24

## 2024-08-05 LAB
ERYTHROCYTE [DISTWIDTH] IN BLOOD BY AUTOMATED COUNT: 14.3 % (ref 11.6–15.1)
HCT VFR BLD AUTO: 38 % (ref 34.8–46.1)
HGB BLD-MCNC: 12.1 G/DL (ref 11.5–15.4)
MCH RBC QN AUTO: 26.9 PG (ref 26.8–34.3)
MCHC RBC AUTO-ENTMCNC: 31.8 G/DL (ref 31.4–37.4)
MCV RBC AUTO: 84 FL (ref 82–98)
PLATELET # BLD AUTO: 304 THOUSANDS/UL (ref 149–390)
PMV BLD AUTO: 11 FL (ref 8.9–12.7)
RBC # BLD AUTO: 4.5 MILLION/UL (ref 3.81–5.12)
TREPONEMA PALLIDUM IGG+IGM AB [PRESENCE] IN SERUM OR PLASMA BY IMMUNOASSAY: NORMAL
WBC # BLD AUTO: 19.69 THOUSAND/UL (ref 4.31–10.16)

## 2024-08-05 PROCEDURE — 94760 N-INVAS EAR/PLS OXIMETRY 1: CPT

## 2024-08-05 PROCEDURE — 94762 N-INVAS EAR/PLS OXIMTRY CONT: CPT

## 2024-08-05 PROCEDURE — 99024 POSTOP FOLLOW-UP VISIT: CPT | Performed by: OBSTETRICS & GYNECOLOGY

## 2024-08-05 PROCEDURE — 85027 COMPLETE CBC AUTOMATED: CPT

## 2024-08-05 RX ADMIN — METRONIDAZOLE 500 MG: 500 TABLET ORAL at 00:34

## 2024-08-05 RX ADMIN — CEPHALEXIN 500 MG: 500 CAPSULE ORAL at 09:08

## 2024-08-05 RX ADMIN — SODIUM CHLORIDE, SODIUM LACTATE, POTASSIUM CHLORIDE, AND CALCIUM CHLORIDE 125 ML/HR: .6; .31; .03; .02 INJECTION, SOLUTION INTRAVENOUS at 11:45

## 2024-08-05 RX ADMIN — METRONIDAZOLE 500 MG: 500 TABLET ORAL at 09:08

## 2024-08-05 RX ADMIN — SENNOSIDES 8.6 MG: 8.6 TABLET, FILM COATED ORAL at 09:08

## 2024-08-05 RX ADMIN — ACETAMINOPHEN 650 MG: 325 TABLET, FILM COATED ORAL at 11:56

## 2024-08-05 RX ADMIN — ACETAMINOPHEN 650 MG: 325 TABLET, FILM COATED ORAL at 18:40

## 2024-08-05 RX ADMIN — CEPHALEXIN 500 MG: 500 CAPSULE ORAL at 00:34

## 2024-08-05 RX ADMIN — SODIUM CHLORIDE, SODIUM LACTATE, POTASSIUM CHLORIDE, AND CALCIUM CHLORIDE 125 ML/HR: .6; .31; .03; .02 INJECTION, SOLUTION INTRAVENOUS at 03:20

## 2024-08-05 RX ADMIN — METRONIDAZOLE 500 MG: 500 TABLET ORAL at 16:40

## 2024-08-05 RX ADMIN — CEPHALEXIN 500 MG: 500 CAPSULE ORAL at 16:40

## 2024-08-05 RX ADMIN — Medication: at 00:57

## 2024-08-05 NOTE — PLAN OF CARE
Problem: PAIN - ADULT  Goal: Verbalizes/displays adequate comfort level or baseline comfort level  Description: Interventions:  - Encourage patient to monitor pain and request assistance  - Assess pain using appropriate pain scale  - Administer analgesics based on type and severity of pain and evaluate response  - Implement non-pharmacological measures as appropriate and evaluate response  - Consider cultural and social influences on pain and pain management  - Notify physician/advanced practitioner if interventions unsuccessful or patient reports new pain  Outcome: Progressing     Problem: INFECTION - ADULT  Goal: Absence or prevention of progression during hospitalization  Description: INTERVENTIONS:  - Assess and monitor for signs and symptoms of infection  - Monitor lab/diagnostic results  - Monitor all insertion sites, i.e. indwelling lines, tubes, and drains  - Monitor endotracheal if appropriate and nasal secretions for changes in amount and color  - Arthur appropriate cooling/warming therapies per order  - Administer medications as ordered  - Instruct and encourage patient and family to use good hand hygiene technique  - Identify and instruct in appropriate isolation precautions for identified infection/condition  Outcome: Progressing  Goal: Absence of fever/infection during neutropenic period  Description: INTERVENTIONS:  - Monitor WBC    Outcome: Progressing     Problem: SAFETY ADULT  Goal: Patient will remain free of falls  Description: INTERVENTIONS:  - Educate patient/family on patient safety including physical limitations  - Instruct patient to call for assistance with activity   - Consult OT/PT to assist with strengthening/mobility   - Keep Call bell within reach  - Keep bed low and locked with side rails adjusted as appropriate  - Keep care items and personal belongings within reach  - Initiate and maintain comfort rounds  - Make Fall Risk Sign visible to staff  - Apply yellow socks and bracelet  for high fall risk patients  - Consider moving patient to room near nurses station  Outcome: Progressing  Goal: Maintain or return to baseline ADL function  Description: INTERVENTIONS:  -  Assess patient's ability to carry out ADLs; assess patient's baseline for ADL function and identify physical deficits which impact ability to perform ADLs (bathing, care of mouth/teeth, toileting, grooming, dressing, etc.)  - Assess/evaluate cause of self-care deficits   - Assess range of motion  - Assess patient's mobility; develop plan if impaired  - Assess patient's need for assistive devices and provide as appropriate  - Encourage maximum independence but intervene and supervise when necessary  - Involve family in performance of ADLs  - Assess for home care needs following discharge   - Consider OT consult to assist with ADL evaluation and planning for discharge  - Provide patient education as appropriate  Outcome: Progressing  Goal: Maintains/Returns to pre admission functional level  Description: INTERVENTIONS:  - Perform AM-PAC 6 Click Basic Mobility/ Daily Activity assessment daily.  - Set and communicate daily mobility goal to care team and patient/family/caregiver.   - Collaborate with rehabilitation services on mobility goals if consulted  - Out of bed for toileting  - Record patient progress and toleration of activity level   Outcome: Progressing     Problem: DISCHARGE PLANNING  Goal: Discharge to home or other facility with appropriate resources  Description: INTERVENTIONS:  - Identify barriers to discharge w/patient and caregiver  - Arrange for needed discharge resources and transportation as appropriate  - Identify discharge learning needs (meds, wound care, etc.)  - Arrange for interpretive services to assist at discharge as needed  - Refer to Case Management Department for coordinating discharge planning if the patient needs post-hospital services based on physician/advanced practitioner order or complex needs  related to functional status, cognitive ability, or social support system  Outcome: Progressing     Problem: Knowledge Deficit  Goal: Patient/family/caregiver demonstrates understanding of disease process, treatment plan, medications, and discharge instructions  Description: Complete learning assessment and assess knowledge base.  Interventions:  - Provide teaching at level of understanding  - Provide teaching via preferred learning methods  Outcome: Progressing  Goal: Verbalizes understanding of labor plan  Description: Assess patient/family/caregiver's baseline knowledge level and ability to understand information.  Provide education via patient/family/caregiver's preferred learning method at appropriate level of understanding.     1. Provide teaching at level of understanding.  2. Provide teaching via preferred learning method(s).  Outcome: Progressing     Problem: Labor & Delivery  Goal: Manages discomfort  Description: Assess and monitor for signs and symptoms of discomfort.  Assess patient's pain level regularly and per hospital policy.  Administer medications as ordered. Support use of nonpharmacological methods to help control pain such as distraction, imagery, relaxation, and application of heat and cold.  Collaborate with interdisciplinary team and patient to determine appropriate pain management plan.    1. Include patient in decisions related to comfort.  2. Offer non-pharmacological pain management interventions.  3. Report ineffective pain management to physician.  Outcome: Progressing  Goal: Patient vital signs are stable  Description: 1. Assess vital signs - vaginal delivery.  Outcome: Progressing     Problem: BIRTH - VAGINAL/ SECTION  Goal: Fetal and maternal status remain reassuring during the birth process  Description: INTERVENTIONS:  - Monitor vital signs  - Monitor fetal heart rate  - Monitor uterine activity  - Monitor labor progression (vaginal delivery)  - DVT prophylaxis  -  Antibiotic prophylaxis  Outcome: Progressing  Goal: Emotionally satisfying birthing experience for mother/fetus  Description: Interventions:  - Assess, plan, implement and evaluate the nursing care given to the patient in labor  - Advocate the philosophy that each childbirth experience is a unique experience and support the family's chosen level of involvement and control during the labor process   - Actively participate in both the patient's and family's teaching of the birth process  - Consider cultural, Caodaism and age-specific factors and plan care for the patient in labor  Outcome: Progressing

## 2024-08-05 NOTE — LACTATION NOTE
This note was copied from a baby's chart.  CONSULT - LACTATION  Baby Girl Chong (Mileyshca) 1 days female MRN: [id number]    [facility] NURSERY Room / Bed: (N)/(N) Encounter: [id number]    Maternal Information     MOTHER:  Lakesha Chong  Maternal Age: 24 y.o.  OB History: # 1 - Date: , Sex: None, Weight: None, GA: 8w0d, Type: Spontaneous , Apgar1: None, Apgar5: None, Living: None, Birth Comments: None    # 2 - Date: 24, Sex: Female, Weight: 3360 g (7 lb 6.5 oz), GA: 40w1d, Type: , Low Transverse, Apgar1: 8, Apgar5: 9, Living: Living, Birth Comments: None   Previouse breast reduction surgery? No    Lactation history:   Has patient previously breast fed: No   How long had patient previously breast fed:     Previous breast feeding complications:       Past Surgical History:   Procedure Laterality Date    NO PAST SURGERIES         Birth information:  YOB: 2024   Time of birth: 10:22 PM   Sex: female   Delivery type: , Low Transverse   Birth Weight: 3360 g (7 lb 6.5 oz)   Percent of Weight Change: 0%     Gestational Age: 40w1d      24 1600   Lactation Consultation   Reason for Consult 10;10 minute   Risk Factors Language barrier  (Peruvian interpeter [id number] utilized)   Maternal Information   Has mother  before? No   Breasts/Nipples   Breastfeeding Status Yes   Breast Pump   Pump 3  (declines insurance provided  breast pump.)   Patient Follow-Up   Lactation Consult Status 2   Follow-Up Type Inpatient;Call as needed   Other OB Lactation Documentation    Additional Problem Noted Baby not present in room at time of initial assessment.  (Reviewed rosemarie RSB and D/C booklets in Peruvian with .)       Feeding recommendations:  breast feed on demand    Discussed AAP recommendation of breastfeeding for 2 years. First six months being exclusively breast milk.    Met with Dyad.  Provided  with Ready, Set, Baby booklet which contained information on:  Hand expression with access to QR codes to review hand expression.  Positioning and latch reviewed as well as showing images of other feeding positions.  Discussed the properties of a good latch in any position.   Feeding on cue and what that means for recognizing infant's hunger, s/s that baby is getting enough milk and some s/s that breastfeeding dyad may need further help  Skin to Skin contact and benefits to mom and baby  Avoidance of pacifiers for the first month discussed.   Gave information on common concerns, what to expect the first few weeks after delivery, preparing for other caregivers, and how partners can help. Resources for support also provided.    Encouraged parents to call for assistance, questions, and concerns about breastfeeding.  Extension provided.      Daja Toussaint RN 8/5/2024 5:06 PM

## 2024-08-05 NOTE — ANESTHESIA PREPROCEDURE EVALUATION
Procedure:   SECTION () (Uterus)    Relevant Problems   GYN   (+) 39 weeks gestation of pregnancy             Anesthesia Plan  ASA Score- 2 Emergent    Anesthesia Type- general with ASA Monitors.         Additional Monitors:     Airway Plan: ETT.    Comment: Stat section, no consent obtained.       Plan Factors-    Induction-     Postoperative Plan-     Perioperative Resuscitation Plan - Level 1 - Full Code.       Informed Consent-

## 2024-08-05 NOTE — DISCHARGE SUMMARY
Discharge Summary - Lakesha Chong 24 y.o. female MRN: [id number]    Unit/Bed#: LD OR  Encounter: [id number]    Admission Date: 2024     Discharge Date: 24    Admitting Attending: Dr. David  Delivering Attending: Dr. David  Discharge Attending: Dr. Recinos    Diagnosis:  40 week gestation  Primary low transverse  section    Procedures: primary low transverse  section     Complications: none apparent     Pt is a 23 yo now  who was admitted for elective induction of labor at 40w1d. Her pregnancy was complicated by hepatitis B non-immunity. Upon presentation, SVE was 1/50/-3 and FHT was category 1. Tocometry had mild irritability, but patient was not feeling contractions. Her IOL was initiated with FB and cytotec. Approximately 7 minutes after cytotec and FB were placed, FHT were noted to drop to the 60s. Maternal blood pressure at this time was noted to be 75/43 and patient described feeling dizzy and lightheaded. She was positioned into left lateral decubitus but blood pressure and FHT did not improve. IVF were opened wide. She was then repositioned to the right side, at which time FB was deflated due to concern for vagal response to balloon. Tocometry was notable for one contraction, but it subsided without intervention and belly was soft in the interim. BP and tones still did not improve and patient was placed in hands-and-knees position, at which time anesthesia and OR staff were noted to prepare for possible emergency . Maternal blood pressure was noted to improve to 119/66 but without improvement in FHT; tones remained in the 60s and variability was noted to be minimal to absent. The team proceeded to the OR in an emergency fashion with patient. Fetal tones in the OR were noted to remain in the 60s. Patient's belly was splashed with betadine and  was performed in a STAT fashion once anesthesia indicated it was safe to proceed after placing  patient under GETA.     She underwent an otherwise uncomplicated  delivery.  She delivered a viable female  at 2222 on 2024. Weight was 7lbs 6.5oz (3360g) with APGARs of 8 and 9 at 1 and 5 minutes. Her preoperative Hb was 12.2 g/dL. Her postoperative Hb was 12.1.  Her postoperative course was uncomplicated.     Condition at discharge: good     On day of discharge pain was well controlled, patient was tolerating PO, passing flatus, has not had a bowel movement. She was discharged with standard post partum/ post operative instructions to follow up with her physician in 1 week for an incision check and in 3-6 weeks for a postpartum appointment.     Discharge instructions/Information to patient and family:   -Do not place anything (no partner, tampons or douche) in your vagina for 6 weeks.  -You may walk for exercise for the first 6 weeks then gradually return to your usual activities.   -Please do not drive for 1 week if you have no stitches and for 2 weeks if you have stitches or underwent a  delivery.    -You may take baths or shower per your preference.   -Please look at your bust (breasts) in the mirror daily and call for redness or tenderness or increased warmth.   -Please call us for temperature > 100.4*F or 38* C, worsening pain or a foul discharge.      Discharge Medications:   Prenatal vitamin daily for 6 months or the duration of nursing whichever is longer.  Motrin 600 mg orally every 6 hours as needed for pain  Tylenol (over the counter) per bottle directions as needed for pain: do NOT use with percocet  Hydrocortisone cream 1% (over the counter) applied 1-2x daily to hemorrhoids as needed  Percocet as needed    Provisions for Follow-Up Care:  Follow up with your doctor in 1 week for incision site check.     Planned Readmission: no    PHILIP Armenta

## 2024-08-05 NOTE — ANESTHESIA POSTPROCEDURE EVALUATION
Post-Op Assessment Note    CV Status:  Stable  Pain Score: 0    Pain management: adequate       Mental Status:  Alert and awake   Hydration Status:  Stable   PONV Controlled:  None   Airway Patency:  Patent and adequate     Post Op Vitals Reviewed: Yes    No anethesia notable event occurred.    Staff: CRNA               BP   109/72   Temp   98   Pulse  79   Resp   14   SpO2   97

## 2024-08-05 NOTE — QUICK NOTE
Lakesha Chong  [id number]  2024  2:58 AM    POST-OP CHECK      [id number]    S:  Lakesha Chong doing well. Patient states that the last thing she really remembers was getting really dizzy and then being wheeled to the OR. We discussed the indication for the emergency , which was the baby's heart rate being so low and staying so low. We discussed that when the baby's HR dropped we tried to improve the tracing by giving her extra fluids and taking the Reis balloon out, but baby did not recover and for that reason we had to do the . Patient expressed understanding. She has no other questions at this time. Pain controlled. Denies nausea/vomiting. Denies chest pain, shortness of breath.    O:  Vitals:    24 0215   BP: 119/79   Pulse: 75   Resp: 20   Temp: 98.2 °F (36.8 °C)   SpO2: 97%       Physical Exam  Vitals reviewed.   Constitutional:       Appearance: Normal appearance.   Cardiovascular:      Rate and Rhythm: Normal rate.   Abdominal:      General: There is no distension.      Palpations: Abdomen is soft.      Tenderness: There is no abdominal tenderness.      Comments: Fundus firm +1 above umbilicus  Incision clean/dry/intact   Genitourinary:     General: Normal vulva.      Comments: Minimal lochia on pad  Musculoskeletal:         General: Normal range of motion.   Skin:     General: Skin is warm and dry.   Neurological:      General: No focal deficit present.      Mental Status: She is alert and oriented to person, place, and time.   Psychiatric:         Mood and Affect: Mood normal.         Behavior: Behavior normal.         A:  Lakesha Chong is s/p 1LTCS that went STAT due to terminal fetal bradycardia, patient is stable and doing well.      P:  Routine postop check  IV/PO pain meds as needed  Regular diet as tolerated  Antiemetics for nausea/vomiting prn  Follow up AM labs  Adequate UOP, continue to monitor  SCDs for DVT ppx,  encourage ambulation as tolerated  Continue 48 hours of surgical site prophylaxis antibiotics due to STAT nature of surgery  Questions answered to patient satisfaction    Imelda Herron MD  OB/GYN PGY2  8/5/2024  2:58 AM

## 2024-08-05 NOTE — OP NOTE
OPERATIVE REPORT  PATIENT NAME: Lakesha Chong    :  2000  MRN: [id number]  Pt Location: [facility] L&D OR ROOM 01    SURGERY DATE: 2024    Surgeons and Role:     * Juan David MD - Primary     * Imelda Herron MD - Assisting    Procedure(s) (LRB):   SECTION () (N/A)    Specimen(s):  ID Type Source Tests Collected by Time Destination   1 :  Tissue (Placenta on Hold) OB Only Placenta TISSUE EXAM OB (PLACENTA) ONLY Juan David MD 2024    A :  Cord Blood Cord BLOOD GAS, VENOUS, CORD Juan David MD 2024    B :  Cord Blood Cord BLOOD GAS, ARTERIAL, CORD Juan David MD 2024         Section Procedure Note    Indications:   Terminal bradycardia    Pre-operative Diagnosis:   40w1d pregnancy  Terminal bradycardia  Hepatitis B non-immune status    Post-operative Diagnosis:   S/p 1LTCS    Joe Group Classification System:   No Multiple pregnancy, No Transverse or oblique lie, No Breech lie, Gestational age is > or =37 weeks, Nulliparous, Labor induced +  is JOE GROUP 2a    Attending: Juan David MD  Resident: Imelda Herron MD    Maternal Findings:  Normal uterus  Normal tubes and ovaries bilaterally  No adhesions  Bilateral rectus muscles hemostatic upon closure  No difficulty noted from skin to delivery     Findings:  Viable female weighing 7 lbs 6.5 oz;  Apgar scores of 8 at one minute and 9 at five minutes.   Clear amniotic fluid  Normal placenta with 3-vessel cord    Arterial and Venous Gases:  Umbilical Cord Venous Blood Gas:  Results from last 7 days   Lab Units 24   PH COV  7.228   PCO2 COV mm HG 61.5*   HCO3 COV mmol/L 25.1   BASE EXC COV mmol/L -4.1*   O2 CT CD VB mL/dL 7.6   O2 HGB, VENOUS CORD % 30.2     Umbilical Cord Arterial Blood Gas:  Results from last 7 days   Lab Units 24   PH COA  7.225*   PCO2 COA  64.4*   PO2 COA mm HG 15.1   HCO3 COA mmol/L 26.1   BASE EXC COA mmol/L  -3.5*   O2 CONTENT CORD ART ml/dl 6.4   O2 HGB, ARTERIAL CORD % 25.0     EBL: 300 mL      Specimens: Arterial and venous cord gases, cord blood, segment of umbilical cord, placenta to pathology    Quantitative Blood Loss: No data recorded    Fluids: LR    Drains: Reis catheter           Complications:  None; patient tolerated the procedure well.           Disposition: PACU            Condition: stable    Procedure Details   Pt is a 23 yo now  who was admitted for elective induction of labor at 40w1d. Her pregnancy was complicated by hepatitis B non-immunity. Upon presentation, SVE was 1/50/-3 and FHT was category 1. Tocometry had mild irritability, but patient was not feeling contractions. Her IOL was initiated with FB and cytotec. Approximately 7 minutes after cytotec and FB were placed, FHT were noted to drop to the 60s. Maternal blood pressure at this time was noted to be 75/43 and patient described feeling dizzy and lightheaded. She was positioned into left lateral decubitus but blood pressure and FHT did not improve. IVF were opened wide. She was then repositioned to the right side, at which time FB was deflated due to concern for vagal response to balloon. Tocometry was notable for one contraction, but it subsided without intervention and belly was soft in the interim. BP and tones still did not improve and patient was placed in hands-and-knees position, at which time anesthesia and OR staff were noted to prepare for possible emergency . Maternal blood pressure was noted to improve to 119/66 but without improvement in FHT; tones remained in the 60s and variability was noted to be minimal to absent. The team proceeded to the OR in an emergency fashion with patient. Fetal tones in the OR were noted to remain in the 60s. Patient's belly was splashed with betadine and  was performed in a STAT fashion once anesthesia team indicated it was safe to proceed after placing patient under GETA.      A Pfannenstiel incision was made and carried down through the underlying subcutaneous tissue to the fascia using a scalpel. Rectus fascia was then incised. We then proceeded in Winston-Henry fashion. All anatomic layers were well-demarcated. The rectus muscles were  and the peritoneum was identified, entered, and extended longitudinally with blunt dissection. A low transverse uterine incision was made with the scalpel and extended cephalad to caudally with blunt dissection. The amnion was entered bluntly for clear fluid.     The fetal head was palpated, elevated, and delivered through the uterine incision followed by the body without difficulty. Time of birth was noted at 2222. There was noted to be spontaneous cry and good tone. There was no apparent injury to the . The umbilical cord was doubly clamped and cut after 30 seconds to allow for delayed cord clamping. The infant was handed off to the  providers. Arterial and venous cord gases, cord blood, and a segment of umbilical cord were obtained for evaluation. The placenta delivered spontaneously at 2225 with uterine fundal massage and appeared normal. The uterus was exteriorized and cleaned out with a moist lap sponge.     The uterine incision was closed with a running locked suture of 0 Vicryl. A second layer of the same suture was used to imbricate the first. Hemostasis was noted to be excellent. The uterus was returned to the abdomen. The paracolic gutters were inspected and cleared of all clots and debris with moist lap sponges. Bilateral rectus muscles were observed to be hemostatic during closure. The fascia was closed with a running suture of 0 Vicryl. The skin was closed with a subcuticular running suture of 4-0 Monocryl. Exofin was applied. The uterine fundus was appreciated to be firm at the completion of closure. Minimal vaginal bleeding was appreciated upon fundal pressure.     The patient appeared to tolerate the procedure  very well. Lap sponge, needle, and instrument counts were correct x2. Abdominal XRAY was performed at bedside as a count-in could not be performed prior to the procedure, and there were no laps or instruments noted to be within the patient. The patient's fundus was palpated and the uterus was expressed. She was then cleaned and transferred to her postpartum recovery room in stable condition and her infant went to the  nursery. Dr. David was present for the entire procedure.      Imelda Herron MD  OB/GYN PGY-2  2024 1:18 AM

## 2024-08-05 NOTE — H&P
H & P- Obstetrics   Lakesha Chong 24 y.o. female MRN: [id number]  Unit/Bed#: -01 Encounter: [id number]    Assessment: 24 y.o.  at 40w1d admitted for elective induction of labor.  SVE: /-3  FHT: cat 1  Clinical EFW: 73%; Cephalic confirmed by TAUS  GBS status: negative   Postpartum contraception plan: nexplanon    Plan:   Not immune to HBV  Assessment & Plan  Offer vaccine postpartum    39 weeks gestation of pregnancy  Assessment & Plan  PNL wnl   A+  GBS neg  EFW 2894 grams - 6 lbs 6 oz (73%) at 35w3d    * Encounter for induction of labor  Assessment & Plan  Admitted for eIOL  Admission labs - CBC, T&S, syphilis screen  Clear liquid diet  Anesthesia consult placed   Rh+, Rhogam not indicated   GBS-, prophylaxis not indicated  IOL with FB/cytotec, transition to pitocin        Discussed case and plan w/ Dr. David.    Chief Complaint: scheduled IOL    HPI: Lakesha Chong is a 24 y.o.  with an SUDHIR of 8/3/2024, by Last Menstrual Period at 40w1d who is being admitted for eIOL. She denies having uterine contractions, has no LOF, and reports no VB. She states she has felt good FM.    Patient Active Problem List   Diagnosis    39 weeks gestation of pregnancy    Not immune to HBV    ASCUS pap/+ other HRHPV    Encounter for induction of labor       Baby complications/comments: none    Review of Systems   Constitutional:  Negative for chills and fever.   Respiratory:  Negative for cough and shortness of breath.    Gastrointestinal:  Negative for diarrhea, nausea and vomiting.   Genitourinary:  Negative for dysuria and hematuria.   Skin:  Negative for color change and rash.   Neurological:  Negative for dizziness, syncope and headaches.       OB Hx:  OB History    Para Term  AB Living   2 0 0 0 1 0   SAB IAB Ectopic Multiple Live Births   1 0 0 0 0      # Outcome Date GA Lbr Luis Angel/2nd Weight Sex Type Anes PTL Lv   2 Current            1 SAB  8w0d    SAB     "      Past Medical Hx:  Past Medical History:   Diagnosis Date    Abnormal Pap smear of cervix     2/2024 ASCUS pap/+ other HRHPV       Past Surgical hx:  Past Surgical History:   Procedure Laterality Date    NO PAST SURGERIES         Social Hx:  Alcohol use: denies  Tobacco use: denies  Other substance use: denies    No Known Allergies      Medications Prior to Admission:     Prenatal Vit-Fe Fumarate-FA (PRENATAL VITAMIN PO)    Objective:  Temp:  [98.7 °F (37.1 °C)] 98.7 °F (37.1 °C)  HR:  [85] 85  Resp:  [16] 16  BP: (119)/(65) 119/65  Body mass index is 25.2 kg/m².     Physical Exam:  Physical Exam  Constitutional:       Appearance: Normal appearance.   Genitourinary:      Vulva normal.   Cardiovascular:      Rate and Rhythm: Normal rate.   Pulmonary:      Effort: Pulmonary effort is normal.   Abdominal:      Palpations: Abdomen is soft.   Neurological:      General: No focal deficit present.      Mental Status: She is alert and oriented to person, place, and time.   Skin:     General: Skin is dry.   Psychiatric:         Mood and Affect: Mood normal.        FHT:  Baseline Rate (FHR): 130 bpm  Variability: Moderate  Decelerations: None    TOCO:   Contraction Frequency (minutes): irritability  Contraction Intensity: Mild, irritability    Lab Results   Component Value Date    WBC 10.44 (H) 08/04/2024    HGB 12.2 08/04/2024    HCT 37.8 08/04/2024     08/04/2024     No results found for: \"NA\", \"K\", \"CL\", \"CO2\", \"BUN\", \"CREATININE\", \"GLUCOSE\", \"AST\", \"ALT\"  Prenatal Labs: Reviewed      Blood type: A+  Antibody: negative  GBS: negative  HIV: non-reactive  Rubella: immune  Syphilis IgM/IgG: non-reactive  HBsAg: non-reactive  HCAb: non-reactive  Chlamydia: negative  Gonorrhea: negative  Diabetes 1 hour screen: passed  3 hour glucose: n/a  Platelets: 274, pending admission CBC  Hgb: 11.7, pending admission CBC  >2 Midnights  INPATIENT     Signature/Title: Imelda Herron MD  Date: 8/4/2024  Time: 10:08 PM    "

## 2024-08-05 NOTE — PROGRESS NOTES
"Progress Note - OB/GYN   Lakesha Chong 24 y.o. female MRN: [id number]  Unit/Bed#: -01 Encounter: [id number]    Assessment:  Post partum Day #01 s/p 1LTCS due to decreased fetal heart tones, stable, baby in the room    Plan:  Not immune to HBV  Assessment & Plan  Offer vaccine postpartum    * S/P  section  Assessment & Plan  , Hgb 12.2 --> Am CBC  Reis catheter removed 2300 2024  DVT ppx:SCDs  Continue routine post partum care  Encourage ambulation  Encourage breastfeeding  Contraception: Nexplanon Out pt  Anticipate discharge PPD 03 vs 04         Subjective/Objective   Chief Complaint:     Post delivery. Patient is doing well. Lochia WNL. Pain well controlled.     Subjective:     Pain: yes, cramping, improved with meds  Tolerating PO: yes  Voiding: yes  Flatus: yes  Ambulating: yes  Chest pain: no  Shortness of breath: no  Leg pain: no  Lochia: minimal    Objective:     Vitals: /61 (BP Location: Left arm)   Pulse 56   Temp 98 °F (36.7 °C) (Oral)   Resp 20   Ht 5' 4\" (1.626 m)   Wt 66.6 kg (146 lb 12.8 oz)   LMP 10/28/2023   SpO2 97%   Breastfeeding Unknown   BMI 25.20 kg/m²     I/O          0701   0700  0701   0700    I.V. (mL/kg)  1000 (15)    Total Intake(mL/kg)  1000 (15)    Urine (mL/kg/hr)  1150    Blood  300    Total Output  1450    Net  -450                  Lab Results   Component Value Date    WBC 19.69 (H) 2024    HGB 12.1 2024    HCT 38.0 2024    MCV 84 2024     2024       Physical Exam:     Gen: AAOx3, NAD  CV: no acute distress  Lungs: no acute distress  Abd: Soft, non-tender, non-distended, no rebound or guarding  Uterine fundus firm and non-tender, 01 cm below the umbilicus. Incision c/d/I  Ext: Non tender    Inaldo Deniz Wheeler MD  OBGYN PGY-1  2024  6:54 AM         "

## 2024-08-05 NOTE — ASSESSMENT & PLAN NOTE
, Hgb 12.2 --> 12.1  Reis catheter removed 2300 8/04/2024  DVT ppx:SCDs  Continue routine post partum care  Encourage ambulation  Encourage breastfeeding  Contraception: Nexplanon Out pt  Anticipate discharge: PPD 03

## 2024-08-05 NOTE — UTILIZATION REVIEW
"NOTIFICATION OF INPATIENT ADMISSION   MATERNITY/DELIVERY AUTHORIZATION REQUEST   SERVICING FACILITY:   [facility]  Parent Child Health - L&D, [place], NICU  [address] [place] [address]  Tax ID: [id number]  NPI: [id number] ATTENDING PROVIDER:  Attending Name and NPI#: Juan David Md [[id number]]  Address: [address] [place] [address]  Phone: [phone]     ADMISSION INFORMATION:  Place of Service: Inpatient [facility]  Place of Service Code: 21  Inpatient Admission Date/Time: 24  9:35 PM  Discharge Date/Time: No discharge date for patient encounter.  Admitting Diagnosis Code/Description:  Encounter for full-term uncomplicated delivery [O80]   Mother: Lakesha Chong 2000 Estimated Date of Delivery: 8/3/24  Delivering clinician: Juan David   OB History          2    Para   1    Term   1       0    AB   1    Living   1         SAB   1    IAB   0    Ectopic   0    Multiple   0    Live Births   1               [place] Name & MRN:   Information for the patient's :  Arlette, Baby Girl (Lakesha) [[id number]]    Delivery Information:  Sex: female  Delivered 2024 10:22 PM by , Low Transverse; Gestational Age: 40w1d    [place] Measurements:  Weight: 7 lb 6.5 oz (3360 g);  Height: 20.25\"    APGAR 1 minute 5 minutes 10 minutes   Totals: 8 9       UTILIZATION REVIEW CONTACT:  Mora Hardy, Utilization   Network Utilization Review Department  Phone: [phone]  Fax [phone]  Email: [email or web address]  Contact for approvals/pending authorizations, clinical reviews, and discharge.   PHYSICIAN ADVISORY SERVICES:  Medical Necessity Denial & Mcuz-ml-Gqvt Review  Phone: [phone]  Fax: [phone]  Email: [email or web address]   DISCHARGE SUPPORT TEAM:  For Patients Discharge Needs & Updates  Phone: [phone] opt. 2 Fax: [phone]  Email: " [email or web address]

## 2024-08-05 NOTE — PLAN OF CARE
Problem: PAIN - ADULT  Goal: Verbalizes/displays adequate comfort level or baseline comfort level  Description: Interventions:  - Encourage patient to monitor pain and request assistance  - Assess pain using appropriate pain scale  - Administer analgesics based on type and severity of pain and evaluate response  - Implement non-pharmacological measures as appropriate and evaluate response  - Consider cultural and social influences on pain and pain management  - Notify physician/advanced practitioner if interventions unsuccessful or patient reports new pain  Outcome: Progressing     Problem: INFECTION - ADULT  Goal: Absence or prevention of progression during hospitalization  Description: INTERVENTIONS:  - Assess and monitor for signs and symptoms of infection  - Monitor lab/diagnostic results  - Monitor all insertion sites, i.e. indwelling lines, tubes, and drains  - Monitor endotracheal if appropriate and nasal secretions for changes in amount and color  - [place] appropriate cooling/warming therapies per order  - Administer medications as ordered  - Instruct and encourage patient and family to use good hand hygiene technique  - Identify and instruct in appropriate isolation precautions for identified infection/condition  Outcome: Progressing  Goal: Absence of fever/infection during neutropenic period  Description: INTERVENTIONS:  - Monitor WBC    Outcome: Progressing     Problem: SAFETY ADULT  Goal: Patient will remain free of falls  Description: INTERVENTIONS:  - Educate patient/family on patient safety including physical limitations  - Instruct patient to call for assistance with activity   - Consult OT/PT to assist with strengthening/mobility   - Keep Call bell within reach  - Keep bed low and locked with side rails adjusted as appropriate  - Keep care items and personal belongings within reach  - Initiate and maintain comfort rounds  - Make Fall Risk Sign visible to staff  - Offer Toileting every  Hours,  in advance of need  - Initiate/Maintain alarm  - Obtain necessary fall risk management equipment:   - Apply yellow socks and bracelet for high fall risk patients  - Consider moving patient to room near nurses station  Outcome: Progressing  Goal: Maintain or return to baseline ADL function  Description: INTERVENTIONS:  -  Assess patient's ability to carry out ADLs; assess patient's baseline for ADL function and identify physical deficits which impact ability to perform ADLs (bathing, care of mouth/teeth, toileting, grooming, dressing, etc.)  - Assess/evaluate cause of self-care deficits   - Assess range of motion  - Assess patient's mobility; develop plan if impaired  - Assess patient's need for assistive devices and provide as appropriate  - Encourage maximum independence but intervene and supervise when necessary  - Involve family in performance of ADLs  - Assess for home care needs following discharge   - Consider OT consult to assist with ADL evaluation and planning for discharge  - Provide patient education as appropriate  Outcome: Progressing  Goal: Maintains/Returns to pre admission functional level  Description: INTERVENTIONS:  - Perform AM-PAC 6 Click Basic Mobility/ Daily Activity assessment daily.  - Set and communicate daily mobility goal to care team and patient/family/caregiver.   - Collaborate with rehabilitation services on mobility goals if consulted    Outcome: Progressing     Problem: DISCHARGE PLANNING  Goal: Discharge to home or other facility with appropriate resources  Description: INTERVENTIONS:  - Identify barriers to discharge w/patient and caregiver  - Arrange for needed discharge resources and transportation as appropriate  - Identify discharge learning needs (meds, wound care, etc.)  - Arrange for interpretive services to assist at discharge as needed  - Refer to Case Management Department for coordinating discharge planning if the patient needs post-hospital services based on  physician/advanced practitioner order or complex needs related to functional status, cognitive ability, or social support system  Outcome: Progressing     Problem: Knowledge Deficit  Goal: Patient/family/caregiver demonstrates understanding of disease process, treatment plan, medications, and discharge instructions  Description: Complete learning assessment and assess knowledge base.  Interventions:  - Provide teaching at level of understanding  - Provide teaching via preferred learning methods  Outcome: Progressing  Goal: Verbalizes understanding of labor plan  Description: Assess patient/family/caregiver's baseline knowledge level and ability to understand information.  Provide education via patient/family/caregiver's preferred learning method at appropriate level of understanding.     1. Provide teaching at level of understanding.  2. Provide teaching via preferred learning method(s).  Outcome: Progressing

## 2024-08-05 NOTE — OB LABOR/OXYTOCIN SAFETY PROGRESS
Labor Progress Note - Lakesha Chong 24 y.o. female MRN: [id number]    Unit/Bed#: -01 Encounter: [id number]       Contraction Frequency (minutes): irritability  Contraction Intensity: Mild  Uterine Activity Characteristics: Irregular  Cervical Dilation: 1        Cervical Effacement: 60  Fetal Station: -3  Baseline Rate (FHR): 130 bpm  Fetal Heart Rate (FHT): 135 BPM  FHR Category: 1               Vital Signs:   Vitals:    08/04/24 2113   BP: 119/65   Pulse: 85   Resp: 16   Temp: 98.7 °F (37.1 °C)       A 24F terry with a 30cc balloon was selected. SVE was performed and cervix was located. Terry was introduced over sterile gloved hands. Balloon advanced through cervix beyond the internal cervical os. A small amount amount of sterile normal saline solution was instilled in the balloon to confirm placement. Placement was confirmed to be beyond the internal cervical os. A total of 60cc of sterile normal saline solution was instilled into the balloon. Cytotec 25 mcg was placed vaginally. Patient tolerated well. Instructions left with RN to place terry to gravity with a 1L bag of IV fluid. Notify MD when terry dislodged.      Imelda Herron MD 8/4/2024 10:07 PM

## 2024-08-06 PROCEDURE — 99024 POSTOP FOLLOW-UP VISIT: CPT | Performed by: OBSTETRICS & GYNECOLOGY

## 2024-08-06 RX ADMIN — ACETAMINOPHEN 650 MG: 325 TABLET, FILM COATED ORAL at 09:38

## 2024-08-06 RX ADMIN — SENNOSIDES 8.6 MG: 8.6 TABLET, FILM COATED ORAL at 09:38

## 2024-08-06 RX ADMIN — CEPHALEXIN 500 MG: 500 CAPSULE ORAL at 01:01

## 2024-08-06 RX ADMIN — ACETAMINOPHEN 650 MG: 325 TABLET, FILM COATED ORAL at 17:19

## 2024-08-06 RX ADMIN — OXYCODONE HYDROCHLORIDE 5 MG: 5 TABLET ORAL at 19:53

## 2024-08-06 RX ADMIN — OXYCODONE HYDROCHLORIDE 5 MG: 5 TABLET ORAL at 14:03

## 2024-08-06 RX ADMIN — CEPHALEXIN 500 MG: 500 CAPSULE ORAL at 09:38

## 2024-08-06 RX ADMIN — METRONIDAZOLE 500 MG: 500 TABLET ORAL at 01:01

## 2024-08-06 RX ADMIN — CEPHALEXIN 500 MG: 500 CAPSULE ORAL at 17:21

## 2024-08-06 RX ADMIN — ACETAMINOPHEN 650 MG: 325 TABLET, FILM COATED ORAL at 01:01

## 2024-08-06 RX ADMIN — SODIUM CHLORIDE, SODIUM LACTATE, POTASSIUM CHLORIDE, AND CALCIUM CHLORIDE 125 ML/HR: .6; .31; .03; .02 INJECTION, SOLUTION INTRAVENOUS at 03:13

## 2024-08-06 RX ADMIN — METRONIDAZOLE 500 MG: 500 TABLET ORAL at 17:21

## 2024-08-06 RX ADMIN — METRONIDAZOLE 500 MG: 500 TABLET ORAL at 09:38

## 2024-08-06 NOTE — NURSING NOTE
Georgina #[id number] used for morning medications review and given to patient along with education about pain management and normal output.  Patient stated she has no questions at this time and is resting comfortably

## 2024-08-06 NOTE — PROGRESS NOTES
"Progress Note - OB/GYN   Lakesha Chong 24 y.o. female MRN: [id number]  Unit/Bed#: -01 Encounter: [id number]    Assessment:  Post partum Day #02 s/p 1LTCS to decreased fetal heart tones, stable, baby in the room.     Plan:  Not immune to HBV  Assessment & Plan  Offer vaccine postpartum but declined by the pt.     * S/P  section  Assessment & Plan  , Hgb 12.2 --> 12.1  Reis catheter removed 2300 2024  DVT ppx:SCDs  Continue routine post partum care  Encourage ambulation  Encourage breastfeeding  Contraception: Nexplanon Out pt  Anticipate discharge PPD 03 vs 04         Subjective/Objective   Chief Complaint:     Post delivery. Patient is doing well. Lochia WNL. Pain well controlled.     Subjective:     Pain: yes, cramping, improved with meds  Tolerating PO: yes  Voiding: yes  Flatus: yes  Ambulating: yes  Chest pain: no  Shortness of breath: no  Leg pain: no  Lochia: minimal    Objective:     Vitals: /62 (BP Location: Left arm)   Pulse 78   Temp 98.4 °F (36.9 °C) (Oral)   Resp 16   Ht 5' 4\" (1.626 m)   Wt 66.6 kg (146 lb 12.8 oz)   LMP 10/28/2023   SpO2 97%   Breastfeeding Yes   BMI 25.20 kg/m²     I/O         / 0701  08/ 0700 08/ 0701  08/ 0700    I.V. (mL/kg) 1000 (15) 1.6 (0)    Total Intake(mL/kg) 1000 (15) 1.6 (0)    Urine (mL/kg/hr) 1550 2000 (1.3)    Blood 300     Total Output 1850     Net -850 -1998.4                  Lab Results   Component Value Date    WBC 19.69 (H) 2024    HGB 12.1 2024    HCT 38.0 2024    MCV 84 2024     2024       Physical Exam:     Gen: AAOx3, NAD  CV: no acute distress  Lungs: no acute distress  Abd: Soft, non-tender, non-distended, no rebound or guarding  Uterine fundus firm and non-tender, 01 cm below the umbilicus. Incision c/d/I  Ext: Non tender    Levi Wheeler MD  [facility] PGY-1  2024  6:36 AM         "

## 2024-08-06 NOTE — PLAN OF CARE
Problem: PAIN - ADULT  Goal: Verbalizes/displays adequate comfort level or baseline comfort level  Description: Interventions:  - Encourage patient to monitor pain and request assistance  - Assess pain using appropriate pain scale  - Administer analgesics based on type and severity of pain and evaluate response  - Implement non-pharmacological measures as appropriate and evaluate response  - Consider cultural and social influences on pain and pain management  - Notify physician/advanced practitioner if interventions unsuccessful or patient reports new pain  Outcome: Progressing     Problem: INFECTION - ADULT  Goal: Absence or prevention of progression during hospitalization  Description: INTERVENTIONS:  - Assess and monitor for signs and symptoms of infection  - Monitor lab/diagnostic results  - Monitor all insertion sites, i.e. indwelling lines, tubes, and drains  - Monitor endotracheal if appropriate and nasal secretions for changes in amount and color  - [place] appropriate cooling/warming therapies per order  - Administer medications as ordered  - Instruct and encourage patient and family to use good hand hygiene technique  - Identify and instruct in appropriate isolation precautions for identified infection/condition  Outcome: Progressing  Goal: Absence of fever/infection during neutropenic period  Description: INTERVENTIONS:  - Monitor WBC    Outcome: Progressing     Problem: SAFETY ADULT  Goal: Patient will remain free of falls  Description: INTERVENTIONS:  - Educate patient/family on patient safety including physical limitations  - Instruct patient to call for assistance with activity   - Consult OT/PT to assist with strengthening/mobility   - Keep Call bell within reach  - Keep bed low and locked with side rails adjusted as appropriate  - Keep care items and personal belongings within reach  - Initiate and maintain comfort rounds  - Make Fall Risk Sign visible to staff  - Offer Toileting every  Hours,  in advance of need  - Initiate/Maintain alarm  - Obtain necessary fall risk management equipment:   - Apply yellow socks and bracelet for high fall risk patients  - Consider moving patient to room near nurses station  Outcome: Progressing  Goal: Maintain or return to baseline ADL function  Description: INTERVENTIONS:  -  Assess patient's ability to carry out ADLs; assess patient's baseline for ADL function and identify physical deficits which impact ability to perform ADLs (bathing, care of mouth/teeth, toileting, grooming, dressing, etc.)  - Assess/evaluate cause of self-care deficits   - Assess range of motion  - Assess patient's mobility; develop plan if impaired  - Assess patient's need for assistive devices and provide as appropriate  - Encourage maximum independence but intervene and supervise when necessary  - Involve family in performance of ADLs  - Assess for home care needs following discharge   - Consider OT consult to assist with ADL evaluation and planning for discharge  - Provide patient education as appropriate  Outcome: Progressing  Goal: Maintains/Returns to pre admission functional level  Description: INTERVENTIONS:  - Perform AM-PAC 6 Click Basic Mobility/ Daily Activity assessment daily.  - Set and communicate daily mobility goal to care team and patient/family/caregiver.   - Collaborate with rehabilitation services on mobility goals if consulted  - Perform Range of Motion times a day.  - Reposition patient every  hours.  - Dangle patient  times a day  - Stand patient  times a day  - Ambulate patient  times a day  - Out of bed to chair  times a day   - Out of bed for meals  times a day  - Out of bed for toileting  - Record patient progress and toleration of activity level   Outcome: Progressing     Problem: DISCHARGE PLANNING  Goal: Discharge to home or other facility with appropriate resources  Description: INTERVENTIONS:  - Identify barriers to discharge w/patient and caregiver  - Arrange for  needed discharge resources and transportation as appropriate  - Identify discharge learning needs (meds, wound care, etc.)  - Arrange for interpretive services to assist at discharge as needed  - Refer to Case Management Department for coordinating discharge planning if the patient needs post-hospital services based on physician/advanced practitioner order or complex needs related to functional status, cognitive ability, or social support system  Outcome: Progressing     Problem: Knowledge Deficit  Goal: Patient/family/caregiver demonstrates understanding of disease process, treatment plan, medications, and discharge instructions  Description: Complete learning assessment and assess knowledge base.  Interventions:  - Provide teaching at level of understanding  - Provide teaching via preferred learning methods  Outcome: Progressing  Goal: Verbalizes understanding of labor plan  Description: Assess patient/family/caregiver's baseline knowledge level and ability to understand information.  Provide education via patient/family/caregiver's preferred learning method at appropriate level of understanding.     1. Provide teaching at level of understanding.  2. Provide teaching via preferred learning method(s).  Outcome: Progressing

## 2024-08-07 VITALS
HEART RATE: 80 BPM | SYSTOLIC BLOOD PRESSURE: 121 MMHG | OXYGEN SATURATION: 98 % | HEIGHT: 64 IN | DIASTOLIC BLOOD PRESSURE: 62 MMHG | BODY MASS INDEX: 25.06 KG/M2 | RESPIRATION RATE: 18 BRPM | WEIGHT: 146.8 LBS | TEMPERATURE: 97.9 F

## 2024-08-07 PROCEDURE — 88307 TISSUE EXAM BY PATHOLOGIST: CPT | Performed by: PATHOLOGY

## 2024-08-07 PROCEDURE — 99024 POSTOP FOLLOW-UP VISIT: CPT

## 2024-08-07 RX ORDER — IBUPROFEN 600 MG/1
600 TABLET ORAL EVERY 6 HOURS PRN
Qty: 30 TABLET | Refills: 0 | Status: SHIPPED | OUTPATIENT
Start: 2024-08-07

## 2024-08-07 RX ORDER — IBUPROFEN 600 MG/1
600 TABLET ORAL EVERY 6 HOURS PRN
Status: DISCONTINUED | OUTPATIENT
Start: 2024-08-07 | End: 2024-08-07 | Stop reason: HOSPADM

## 2024-08-07 RX ORDER — ACETAMINOPHEN 325 MG/1
650 TABLET ORAL EVERY 6 HOURS SCHEDULED
Qty: 10 TABLET | Refills: 0 | Status: SHIPPED | OUTPATIENT
Start: 2024-08-07 | End: 2024-08-09

## 2024-08-07 RX ORDER — OXYCODONE HYDROCHLORIDE 5 MG/1
5 TABLET ORAL EVERY 4 HOURS PRN
Qty: 15 TABLET | Refills: 0 | Status: SHIPPED | OUTPATIENT
Start: 2024-08-07 | End: 2024-08-12

## 2024-08-07 RX ADMIN — ACETAMINOPHEN 650 MG: 325 TABLET, FILM COATED ORAL at 00:36

## 2024-08-07 RX ADMIN — SENNOSIDES 8.6 MG: 8.6 TABLET, FILM COATED ORAL at 09:26

## 2024-08-07 RX ADMIN — OXYCODONE HYDROCHLORIDE 10 MG: 5 TABLET ORAL at 09:26

## 2024-08-07 RX ADMIN — ACETAMINOPHEN 650 MG: 325 TABLET, FILM COATED ORAL at 05:56

## 2024-08-07 RX ADMIN — IBUPROFEN 600 MG: 600 TABLET, FILM COATED ORAL at 11:21

## 2024-08-07 RX ADMIN — SIMETHICONE 80 MG: 80 TABLET, CHEWABLE ORAL at 00:38

## 2024-08-07 NOTE — UTILIZATION REVIEW
NOTIFICATION OF ADMISSION DISCHARGE   This is a Notification of Discharge from [facility]. Please be advised that this patient has been discharge from our facility. Below you will find the admission and discharge date and time including the patient’s disposition.   UTILIZATION REVIEW CONTACT:  Mora Hardy  Utilization   Network Utilization Review Department  Phone: [phone] x carefully listen to the prompts. All voicemails are confidential.  Email: [email or web address]     ADMISSION INFORMATION  PRESENTATION DATE: 8/4/2024  8:41 PM  OBERVATION ADMISSION DATE: N/A  INPATIENT ADMISSION DATE: 8/4/24  9:35 PM   DISCHARGE DATE: 8/7/2024  3:57 PM   DISPOSITION:Home/Self Care    Network Utilization Review Department  ATTENTION: Please call with any questions or concerns to [phone] and carefully listen to the prompts so that you are directed to the right person. All voicemails are confidential.   For Discharge needs, contact Care Management DC Support Team at [phone] opt. 2  Send all requests for admission clinical reviews, approved or denied determinations and any other requests to dedicated fax number below belonging to the campus where the patient is receiving treatment. List of dedicated fax numbers for the Facilities:  FACILITY NAME UR FAX NUMBER   ADMISSION DENIALS (Administrative/Medical Necessity) [phone]   DISCHARGE SUPPORT TEAM ([facility]) [phone]   PARENT CHILD HEALTH (Maternity/NICU/Pediatrics) [phone]   [facility] [phone]   [facility] [phone]   [facility] [phone]   [facility] [phone]   [facility] [phone]   [facility] [phone]   [facility] [phone]   [facility] [phone]   [facility]  [facility] [phone]   [facility] [phone]   [facility] [phone]   [facility] [phone]

## 2024-08-07 NOTE — PLAN OF CARE
Problem: PAIN - ADULT  Goal: Verbalizes/displays adequate comfort level or baseline comfort level  Description: Interventions:  - Encourage patient to monitor pain and request assistance  - Assess pain using appropriate pain scale  - Administer analgesics based on type and severity of pain and evaluate response  - Implement non-pharmacological measures as appropriate and evaluate response  - Consider cultural and social influences on pain and pain management  - Notify physician/advanced practitioner if interventions unsuccessful or patient reports new pain  Outcome: Progressing     Problem: INFECTION - ADULT  Goal: Absence or prevention of progression during hospitalization  Description: INTERVENTIONS:  - Assess and monitor for signs and symptoms of infection  - Monitor lab/diagnostic results  - Monitor all insertion sites, i.e. indwelling lines, tubes, and drains  - Monitor endotracheal if appropriate and nasal secretions for changes in amount and color  - [place] appropriate cooling/warming therapies per order  - Administer medications as ordered  - Instruct and encourage patient and family to use good hand hygiene technique  - Identify and instruct in appropriate isolation precautions for identified infection/condition  Outcome: Progressing  Goal: Absence of fever/infection during neutropenic period  Description: INTERVENTIONS:  - Monitor WBC    Outcome: Progressing     Problem: SAFETY ADULT  Goal: Patient will remain free of falls  Description: INTERVENTIONS:  - Educate patient/family on patient safety including physical limitations  - Instruct patient to call for assistance with activity   - Consult OT/PT to assist with strengthening/mobility   - Keep Call bell within reach  - Keep bed low and locked with side rails adjusted as appropriate  - Keep care items and personal belongings within reach  - Initiate and maintain comfort rounds  - Make Fall Risk Sign visible to staff  - Offer Toileting every  Hours,  in advance of need  - Initiate/Maintain alarm  - Obtain necessary fall risk management equipment:   - Apply yellow socks and bracelet for high fall risk patients  - Consider moving patient to room near nurses station  Outcome: Progressing  Goal: Maintain or return to baseline ADL function  Description: INTERVENTIONS:  -  Assess patient's ability to carry out ADLs; assess patient's baseline for ADL function and identify physical deficits which impact ability to perform ADLs (bathing, care of mouth/teeth, toileting, grooming, dressing, etc.)  - Assess/evaluate cause of self-care deficits   - Assess range of motion  - Assess patient's mobility; develop plan if impaired  - Assess patient's need for assistive devices and provide as appropriate  - Encourage maximum independence but intervene and supervise when necessary  - Involve family in performance of ADLs  - Assess for home care needs following discharge   - Consider OT consult to assist with ADL evaluation and planning for discharge  - Provide patient education as appropriate  Outcome: Progressing  Goal: Maintains/Returns to pre admission functional level  Description: INTERVENTIONS:  - Perform AM-PAC 6 Click Basic Mobility/ Daily Activity assessment daily.  - Set and communicate daily mobility goal to care team and patient/family/caregiver.   - Collaborate with rehabilitation services on mobility goals if consulted  - Perform Range of Motion  times a day.  - Reposition patient every  hours.  - Dangle patient  times a day  - Stand patient  times a day  - Ambulate patient  times a day  - Out of bed to chair  times a day   - Out of bed for meals  times a day  - Out of bed for toileting  - Record patient progress and toleration of activity level   Outcome: Progressing     Problem: DISCHARGE PLANNING  Goal: Discharge to home or other facility with appropriate resources  Description: INTERVENTIONS:  - Identify barriers to discharge w/patient and caregiver  - Arrange for  needed discharge resources and transportation as appropriate  - Identify discharge learning needs (meds, wound care, etc.)  - Arrange for interpretive services to assist at discharge as needed  - Refer to Case Management Department for coordinating discharge planning if the patient needs post-hospital services based on physician/advanced practitioner order or complex needs related to functional status, cognitive ability, or social support system  Outcome: Progressing     Problem: Knowledge Deficit  Goal: Patient/family/caregiver demonstrates understanding of disease process, treatment plan, medications, and discharge instructions  Description: Complete learning assessment and assess knowledge base.  Interventions:  - Provide teaching at level of understanding  - Provide teaching via preferred learning methods  Outcome: Progressing  Goal: Verbalizes understanding of labor plan  Description: Assess patient/family/caregiver's baseline knowledge level and ability to understand information.  Provide education via patient/family/caregiver's preferred learning method at appropriate level of understanding.     1. Provide teaching at level of understanding.  2. Provide teaching via preferred learning method(s).  Outcome: Progressing

## 2024-08-07 NOTE — PROGRESS NOTES
"Progress Note - OB/GYN  Lakesha Chong 24 y.o. female MRN: [id number]  Unit/Bed#:  414-01 Encounter: [id number]    Assessment and Plan     Lakesha Chong is a patient of: [facility].  She is PPD# 3 s/p  primary  section, low transverse incision  Recovering well and is stable       Not immune to HBV  Assessment & Plan  Offer vaccine postpartum but declined by the pt.     * S/P  section  Assessment & Plan  , Hgb 12.2 --> 12.1  Reis catheter removed 2300 2024  DVT ppx:SCDs  Continue routine post partum care  Encourage ambulation  Encourage breastfeeding  Contraception: Nexplanon Out pt  Anticipate discharge: PPD 03        Disposition    - Anticipate discharge home on PPD# 3      Subjective/Objective     Chief Complaint: Postpartum State     Subjective:    Lakesha Chong is POD#3 s/p  primary  section, low transverse incision. She has no current complaints.  Pain is well controlled. She is recovering well and is stable.     Breastfeeding:  yes    Tolerating PO: yes  Nausea or Vomiting: no  Voiding: yes  Flatus: yes; has had no bowel movement.   Ambulating: yes  Chest pain: no  Shortness of breath: no  Leg pain: no  Lochia: appropriate     Vitals:   /68 (BP Location: Left arm)   Pulse 80   Temp 97.8 °F (36.6 °C) (Temporal)   Resp 16   Ht 5' 4\" (1.626 m)   Wt 66.6 kg (146 lb 12.8 oz)   LMP 10/28/2023   SpO2 98%   Breastfeeding Yes   BMI 25.20 kg/m²     No intake or output data in the 24 hours ending 24 0742    Invasive Devices       Peripheral Intravenous Line  Duration             Peripheral IV 24 Distal;Right;Ventral (anterior) Forearm 2 days                    Physical Exam:   GEN: Lakesha Chong appears well, alert and oriented x 3, pleasant and cooperative   CARDIO: RRR, no murmurs or rubs  RESP:  CTAB, no wheezes or rales  ABDOMEN: soft, no tenderness, no distention, fundus @ U-1, Incision " C/D/I  EXTREMITIES: SCDs on, non tender, no erythema, b/l Lizbeth's sign negative      Labs:     Hemoglobin   Date Value Ref Range Status   08/05/2024 12.1 11.5 - 15.4 g/dL Final   08/04/2024 12.2 11.5 - 15.4 g/dL Final     WBC   Date Value Ref Range Status   08/05/2024 19.69 (H) 4.31 - 10.16 Thousand/uL Final   08/04/2024 10.44 (H) 4.31 - 10.16 Thousand/uL Final     Platelets   Date Value Ref Range Status   08/05/2024 304 149 - 390 Thousands/uL Final   08/04/2024 328 149 - 390 Thousands/uL Final          PHILIP Duong  8/7/2024  7:42 AM

## 2024-08-07 NOTE — LACTATION NOTE
This note was copied from a baby's chart.  Mom states that breastfeeding is going well, she reports regular feedings with comfortable latch and good suck/swallow.  She states that baby is feeding well on the right breast,  not feeding as often on the left, discussed different positoing, encoraged to offer both breasts at each feeding, discussed pumpng the left breast as needed to pretect milk supply.     Met with mother to review the Discharge Breastfeeding book, including use of the the feeding log, expected number of feedings and output; breastfeeding and your lifestyle( medications, caffeine, drugs, alcohol use); how to recognize and and remedy at home and when to call the doctor for: breast engorgement, block milked ducts and mastitis; storage and preparation of breast milk; cleaning of bottles and pump parts; paced bottle feeding and how to contact the [facility] as needed.    Encouraged appt at [facility] for follow up support, Scottie states she will call as needed if feeding on the left does not improve.    Encouraged to call for latch check and for assistance as needed.    Language line  [id number] used for consult and all education.

## 2024-09-03 ENCOUNTER — POSTPARTUM VISIT (OUTPATIENT)
Dept: OBGYN CLINIC | Facility: CLINIC | Age: 24
End: 2024-09-03

## 2024-09-03 VITALS
BODY MASS INDEX: 21.27 KG/M2 | DIASTOLIC BLOOD PRESSURE: 62 MMHG | WEIGHT: 124.6 LBS | SYSTOLIC BLOOD PRESSURE: 97 MMHG | HEART RATE: 62 BPM | HEIGHT: 64 IN

## 2024-09-03 DIAGNOSIS — Z30.09 GENERAL COUNSELING AND ADVICE ON FEMALE CONTRACEPTION: ICD-10-CM

## 2024-09-03 DIAGNOSIS — Z30.013 ENCOUNTER FOR INITIAL PRESCRIPTION OF INJECTABLE CONTRACEPTIVE: ICD-10-CM

## 2024-09-03 DIAGNOSIS — Z98.891 S/P CESAREAN SECTION: ICD-10-CM

## 2024-09-03 PROCEDURE — 99214 OFFICE O/P EST MOD 30 MIN: CPT | Performed by: NURSE PRACTITIONER

## 2024-09-03 PROCEDURE — 0503F POSTPARTUM CARE VISIT: CPT | Performed by: NURSE PRACTITIONER

## 2024-09-03 RX ORDER — MEDROXYPROGESTERONE ACETATE 150 MG/ML
150 INJECTION, SUSPENSION INTRAMUSCULAR
Qty: 1 ML | Refills: 5 | Status: SHIPPED | OUTPATIENT
Start: 2024-09-03 | End: 2024-09-03

## 2024-09-03 RX ORDER — MEDROXYPROGESTERONE ACETATE 150 MG/ML
150 INJECTION, SUSPENSION INTRAMUSCULAR
Qty: 1 ML | Refills: 5 | Status: SHIPPED | OUTPATIENT
Start: 2024-09-03

## 2024-09-03 NOTE — PROGRESS NOTES
"OB POSTPARTUM VISIT PROGRESS NOTE  Date of Encounter: 9/3/2024    Lakesha Chong    : 2000  (24 y.o.)  MR: [id number]    Subjective   Lakesha is in for her postpartum visit. She is now . She delivered by primary  section. She's generally doing well, denies current pain or bleeding issues, and has no significant depression issues. [place] score was 2, pt states she is assisted by FOB She is breast feeding with some supplementation. Discussed ways to increase milk supplyWe discussed all appropriate contaceptive options and she chooses Depoprovera  Safe and effective use of Depoprovera was provided, discussed irregular bleeding pattern and weight gain, advised a healthy diet  Pt also requested information on Nexplanon which was provided.  Denies problem with urinating  or BM's  Aware exercise and intercourse should not resume until the baby is 6 weeks old    LABS:  2024 last PAP ASCUS     Objective   EXAM:  GENERAL: alert, well appearing, and in no distress.  VITALS: Blood pressure 97/62, pulse 62, height 5' 4\" (1.626 m), weight 56.5 kg (124 lb 9.6 oz), last menstrual period 10/28/2023, currently breastfeeding.  BMI: Body mass index is 21.39 kg/m².  BREASTS: Denies pain, redness or lumps  ABDOMEN: Soft, NT, LTCS incision is healed, negative discharge or erythema  EXTREMITIES: Denies pain, redness or edema    Assessment & Plan   Diagnoses and all orders for this visit:    Postpartum follow-up    S/P  section    General counseling and advice on female contraception    Encounter for initial prescription of injectable contraceptive  -     Discontinue: medroxyPROGESTERone (DEPO-PROVERA) 150 mg/mL injection; Inject 1 mL (150 mg total) into a muscle every 3 (three) months  -     medroxyPROGESTERone (DEPO-PROVERA) 150 mg/mL injection; Inject 1 mL (150 mg total) into a muscle every 3 (three) months        Plan  Patient Instructions   Return tomorrow for Depoprovera  Annual " GYN exam is due after 2/9/2025  Call with needs or concerns  Return for Depoprovera injection tomorrow.  Pt verbalized understanding of all discussed.      PHILIP Merchant

## 2024-09-03 NOTE — PATIENT INSTRUCTIONS
Return tomorrow for Depoprovera  Annual GYN exam is due after 2/9/2025  Call with needs or concerns

## 2024-09-06 ENCOUNTER — CLINICAL SUPPORT (OUTPATIENT)
Dept: OBGYN CLINIC | Facility: CLINIC | Age: 24
End: 2024-09-06

## 2024-09-06 VITALS
HEIGHT: 64 IN | DIASTOLIC BLOOD PRESSURE: 70 MMHG | WEIGHT: 125.2 LBS | BODY MASS INDEX: 21.37 KG/M2 | HEART RATE: 51 BPM | SYSTOLIC BLOOD PRESSURE: 112 MMHG

## 2024-09-06 DIAGNOSIS — Z30.42 ENCOUNTER FOR SURVEILLANCE OF INJECTABLE CONTRACEPTIVE: Primary | ICD-10-CM

## 2024-09-06 DIAGNOSIS — Z30.09 UNWANTED FERTILITY: ICD-10-CM

## 2024-09-06 LAB — SL AMB POCT URINE HCG: POSITIVE

## 2024-09-06 PROCEDURE — 81025 URINE PREGNANCY TEST: CPT

## 2024-09-06 PROCEDURE — 96372 THER/PROPH/DIAG INJ SC/IM: CPT

## 2024-09-06 RX ORDER — MEDROXYPROGESTERONE ACETATE 150 MG/ML
150 INJECTION, SUSPENSION INTRAMUSCULAR ONCE
Status: COMPLETED | OUTPATIENT
Start: 2024-09-06 | End: 2024-09-06

## 2024-09-06 RX ADMIN — MEDROXYPROGESTERONE ACETATE 150 MG: 150 INJECTION, SUSPENSION INTRAMUSCULAR at 09:50

## 2024-09-06 NOTE — PROGRESS NOTES
3 pregnancy tests were performed since the first one came out positive for a total of 4 pregnancy tests (all positive). Mya Sommer was alerted. Chapis and I communicated with the patient about the results and that since she was recently pregnant, some pregnancy hormones might still be left in her body. Patient verbalized understanding and depo was given to patient in left deltoid on 9/6/2024 per Chapis. Patient was advised that it takes 2 weeks for the depo to take effect, so it was recommended to wear protection during that time should she decide to have intercourse. Pregnancy test will be repeated at her next depo appointment.     NDC: 12601-032-31  LOT: [id number]  EXP: 02/29/2028

## 2024-11-25 ENCOUNTER — CLINICAL SUPPORT (OUTPATIENT)
Dept: OBGYN CLINIC | Facility: CLINIC | Age: 24
End: 2024-11-25

## 2024-11-25 VITALS
HEIGHT: 64 IN | HEART RATE: 51 BPM | BODY MASS INDEX: 19.87 KG/M2 | DIASTOLIC BLOOD PRESSURE: 65 MMHG | WEIGHT: 116.4 LBS | SYSTOLIC BLOOD PRESSURE: 101 MMHG

## 2024-11-25 DIAGNOSIS — Z30.42 ENCOUNTER FOR SURVEILLANCE OF INJECTABLE CONTRACEPTIVE: Primary | ICD-10-CM

## 2024-11-25 DIAGNOSIS — Z30.09 UNWANTED FERTILITY: ICD-10-CM

## 2024-11-25 LAB — SL AMB POCT URINE HCG: NEGATIVE

## 2024-11-25 PROCEDURE — 81025 URINE PREGNANCY TEST: CPT

## 2024-11-25 PROCEDURE — 96372 THER/PROPH/DIAG INJ SC/IM: CPT

## 2024-11-25 RX ORDER — MEDROXYPROGESTERONE ACETATE 150 MG/ML
150 INJECTION, SUSPENSION INTRAMUSCULAR ONCE
Status: COMPLETED | OUTPATIENT
Start: 2024-11-25 | End: 2024-11-25

## 2024-11-25 RX ADMIN — MEDROXYPROGESTERONE ACETATE 150 MG: 150 INJECTION, SUSPENSION INTRAMUSCULAR at 09:04

## 2024-11-25 NOTE — PROGRESS NOTES
Depo given to patient in left arm on 11/25/2024. Pregnancy test negative.    NDC: 28841-714-21  LOT: [id number]  EXP: 02/28/2029

## 2025-02-12 ENCOUNTER — CLINICAL SUPPORT (OUTPATIENT)
Dept: OBGYN CLINIC | Facility: CLINIC | Age: 25
End: 2025-02-12

## 2025-02-12 VITALS — SYSTOLIC BLOOD PRESSURE: 110 MMHG | WEIGHT: 110 LBS | DIASTOLIC BLOOD PRESSURE: 70 MMHG | BODY MASS INDEX: 18.88 KG/M2

## 2025-02-12 DIAGNOSIS — Z30.42 ENCOUNTER FOR SURVEILLANCE OF INJECTABLE CONTRACEPTIVE: Primary | ICD-10-CM

## 2025-02-12 PROCEDURE — 96372 THER/PROPH/DIAG INJ SC/IM: CPT

## 2025-02-12 PROCEDURE — 81025 URINE PREGNANCY TEST: CPT

## 2025-02-12 RX ORDER — MEDROXYPROGESTERONE ACETATE 150 MG/ML
150 INJECTION, SUSPENSION INTRAMUSCULAR ONCE
Status: COMPLETED | OUTPATIENT
Start: 2025-02-12 | End: 2025-02-12

## 2025-02-12 RX ADMIN — MEDROXYPROGESTERONE ACETATE 150 MG: 150 INJECTION, SUSPENSION INTRAMUSCULAR at 08:31

## 2025-02-13 ENCOUNTER — TELEPHONE (OUTPATIENT)
Dept: OBGYN CLINIC | Facility: CLINIC | Age: 25
End: 2025-02-13

## 2025-02-13 LAB — SL AMB POCT URINE HCG: NEGATIVE

## 2025-02-13 NOTE — TELEPHONE ENCOUNTER
----- Message from [place] AKHTAR sent at 3/14/2024  4:15 PM EDT -----  Regarding: yearly  2/9/24 - ASCUS HPV other pos repeat pap in one year.

## 2025-03-14 ENCOUNTER — TELEPHONE (OUTPATIENT)
Dept: OBGYN CLINIC | Facility: CLINIC | Age: 25
End: 2025-03-14

## 2025-05-13 ENCOUNTER — CLINICAL SUPPORT (OUTPATIENT)
Dept: OBGYN CLINIC | Facility: CLINIC | Age: 25
End: 2025-05-13

## 2025-05-13 VITALS
SYSTOLIC BLOOD PRESSURE: 98 MMHG | BODY MASS INDEX: 18.03 KG/M2 | DIASTOLIC BLOOD PRESSURE: 60 MMHG | WEIGHT: 105.6 LBS | HEIGHT: 64 IN

## 2025-05-13 DIAGNOSIS — Z30.09 UNWANTED FERTILITY: Primary | ICD-10-CM

## 2025-05-13 RX ORDER — MEDROXYPROGESTERONE ACETATE 150 MG/ML
150 INJECTION, SUSPENSION INTRAMUSCULAR ONCE
Status: COMPLETED | OUTPATIENT
Start: 2025-05-13 | End: 2025-05-13

## 2025-05-13 RX ORDER — MEDROXYPROGESTERONE ACETATE 150 MG/ML
INJECTION, SUSPENSION INTRAMUSCULAR
COMMUNITY
Start: 2025-02-11

## 2025-05-13 RX ADMIN — MEDROXYPROGESTERONE ACETATE 150 MG: 150 INJECTION, SUSPENSION INTRAMUSCULAR at 10:09

## 2025-05-13 NOTE — PROGRESS NOTES
Depo given to patient in right arm on 5/13/2025.    NDC: 48293-957-71  LOT: [id number]  EXP: 07/31/2026

## 2025-05-16 ENCOUNTER — TELEPHONE (OUTPATIENT)
Dept: OBGYN CLINIC | Facility: CLINIC | Age: 25
End: 2025-05-16

## 2025-08-08 ENCOUNTER — CLINICAL SUPPORT (OUTPATIENT)
Dept: OBGYN CLINIC | Facility: CLINIC | Age: 25
End: 2025-08-08

## 2025-08-08 VITALS — DIASTOLIC BLOOD PRESSURE: 70 MMHG | SYSTOLIC BLOOD PRESSURE: 120 MMHG | WEIGHT: 110.8 LBS | BODY MASS INDEX: 19.02 KG/M2

## 2025-08-08 DIAGNOSIS — Z30.42 ENCOUNTER FOR SURVEILLANCE OF INJECTABLE CONTRACEPTIVE: Primary | ICD-10-CM

## 2025-08-08 PROCEDURE — 96372 THER/PROPH/DIAG INJ SC/IM: CPT

## 2025-08-08 RX ORDER — MEDROXYPROGESTERONE ACETATE 150 MG/ML
150 INJECTION, SUSPENSION INTRAMUSCULAR ONCE
Status: COMPLETED | OUTPATIENT
Start: 2025-08-08 | End: 2025-08-08

## 2025-08-08 RX ADMIN — MEDROXYPROGESTERONE ACETATE 150 MG: 150 INJECTION, SUSPENSION INTRAMUSCULAR at 09:02

## (undated) DEVICE — GLOVE PI ULTRA TOUCH SZ.7.5

## (undated) DEVICE — SUT VICRYL 0 CTX 36 IN J978H

## (undated) DEVICE — ADHESIVE SKIN HIGH VISCOSITY EXOFIN 1ML

## (undated) DEVICE — KIT,BETHLEHEM C SECT DELIVERY: Brand: CARDINAL HEALTH

## (undated) DEVICE — CHLORAPREP HI-LITE 26ML ORANGE

## (undated) DEVICE — SUT MONOCRYL 4-0 PS-2 27 IN Y426H

## (undated) DEVICE — MEDI-VAC YANKAUER SUCTION HANDLE W/STRAIGHT TIP & CONTROL VENT: Brand: CARDINAL HEALTH

## (undated) DEVICE — SUT VICRYL 0 CT 36 IN J958H

## (undated) DEVICE — SUT CHROMIC 0 CT-1 27 IN 812H

## (undated) DEVICE — GLOVE INDICATOR PI UNDERGLOVE SZ 7.5 BLUE

## (undated) DEVICE — SPONGE LAP 18 X 18 IN STRL RFD